# Patient Record
Sex: MALE | Race: WHITE | NOT HISPANIC OR LATINO | Employment: OTHER | URBAN - NONMETROPOLITAN AREA
[De-identification: names, ages, dates, MRNs, and addresses within clinical notes are randomized per-mention and may not be internally consistent; named-entity substitution may affect disease eponyms.]

---

## 2017-03-13 ENCOUNTER — TELEPHONE (OUTPATIENT)
Dept: FAMILY MEDICINE CLINIC | Facility: CLINIC | Age: 57
End: 2017-03-13

## 2017-03-13 RX ORDER — AMLODIPINE BESYLATE 5 MG/1
5 TABLET ORAL DAILY
Qty: 30 TABLET | Refills: 5 | Status: SHIPPED | OUTPATIENT
Start: 2017-03-13 | End: 2017-05-18 | Stop reason: SDUPTHER

## 2017-03-13 RX ORDER — AMLODIPINE BESYLATE 5 MG/1
TABLET ORAL
Qty: 30 TABLET | Refills: 0 | Status: SHIPPED | OUTPATIENT
Start: 2017-03-13 | End: 2017-03-13 | Stop reason: SDUPTHER

## 2017-04-03 ENCOUNTER — TELEPHONE (OUTPATIENT)
Dept: FAMILY MEDICINE CLINIC | Facility: CLINIC | Age: 57
End: 2017-04-03

## 2017-04-03 RX ORDER — CITALOPRAM 40 MG/1
40 TABLET ORAL DAILY
Qty: 30 TABLET | Refills: 5 | Status: SHIPPED | OUTPATIENT
Start: 2017-04-03 | End: 2017-05-18 | Stop reason: SDUPTHER

## 2017-04-03 RX ORDER — LISINOPRIL 40 MG/1
80 TABLET ORAL DAILY
Qty: 60 TABLET | Refills: 5 | Status: SHIPPED | OUTPATIENT
Start: 2017-04-03 | End: 2017-05-18 | Stop reason: SDUPTHER

## 2017-04-03 NOTE — TELEPHONE ENCOUNTER
----- Message from Casi Davis sent at 4/3/2017  3:45 PM CDT -----  Regarding: FABRICIO CAMERON  Contact: [phone]  MARA MCKEON MADE APPT FOR MAY AS THAT IS FIRST OPENING..BUT IS NEEDING REFILLS TO BE SENT TO [facility] FOR  AMLODIPINE..LISINIOPRIL..AND CITALOPRAM

## 2017-04-12 RX ORDER — ZOLPIDEM TARTRATE 10 MG/1
TABLET ORAL
Qty: 30 TABLET | Refills: 0 | Status: CANCELLED | OUTPATIENT
Start: 2017-04-12

## 2017-04-12 NOTE — TELEPHONE ENCOUNTER
Please advise on refill request for his Ambien 10 mg  #30 Take 1 HS PRN    LAST OV  12/21/16  NEXT SCHEDULED OV 5/03/17  LAST SCRIPT WRITTEN 12/21/17 #30 WITH 2 REFILLS  LAST JESS NOTHING ON FILE IN EPIC.

## 2017-04-13 RX ORDER — ZOLPIDEM TARTRATE 10 MG/1
10 TABLET ORAL NIGHTLY PRN
Qty: 30 TABLET | Refills: 2 | Status: SHIPPED | OUTPATIENT
Start: 2017-04-13 | End: 2017-05-18 | Stop reason: SDUPTHER

## 2017-05-18 ENCOUNTER — OFFICE VISIT (OUTPATIENT)
Dept: FAMILY MEDICINE CLINIC | Facility: CLINIC | Age: 57
End: 2017-05-18

## 2017-05-18 VITALS
WEIGHT: 264.9 LBS | OXYGEN SATURATION: 98 % | HEART RATE: 78 BPM | DIASTOLIC BLOOD PRESSURE: 80 MMHG | BODY MASS INDEX: 38.01 KG/M2 | SYSTOLIC BLOOD PRESSURE: 128 MMHG

## 2017-05-18 DIAGNOSIS — I10 ESSENTIAL HYPERTENSION: Primary | ICD-10-CM

## 2017-05-18 DIAGNOSIS — M15.9 PRIMARY OSTEOARTHRITIS INVOLVING MULTIPLE JOINTS: ICD-10-CM

## 2017-05-18 PROCEDURE — 99214 OFFICE O/P EST MOD 30 MIN: CPT | Performed by: FAMILY MEDICINE

## 2017-05-18 PROCEDURE — 96372 THER/PROPH/DIAG INJ SC/IM: CPT | Performed by: FAMILY MEDICINE

## 2017-05-18 RX ORDER — MOMETASONE FUROATE 50 UG/1
2 SPRAY, METERED NASAL DAILY
Qty: 1 EACH | Refills: 5 | Status: SHIPPED | OUTPATIENT
Start: 2017-05-18 | End: 2018-04-18 | Stop reason: SDUPTHER

## 2017-05-18 RX ORDER — TRIAMCINOLONE ACETONIDE 40 MG/ML
80 INJECTION, SUSPENSION INTRA-ARTICULAR; INTRAMUSCULAR ONCE
Status: COMPLETED | OUTPATIENT
Start: 2017-05-18 | End: 2017-05-18

## 2017-05-18 RX ORDER — MOMETASONE FUROATE 50 UG/1
2 SPRAY, METERED NASAL DAILY
Refills: 11 | COMMUNITY
Start: 2017-04-04 | End: 2017-05-18 | Stop reason: SDUPTHER

## 2017-05-18 RX ORDER — CYCLOBENZAPRINE HCL 10 MG
10 TABLET ORAL 2 TIMES DAILY PRN
Qty: 60 TABLET | Refills: 11 | Status: SHIPPED | OUTPATIENT
Start: 2017-05-18 | End: 2017-12-07 | Stop reason: SDUPTHER

## 2017-05-18 RX ORDER — AMLODIPINE BESYLATE 5 MG/1
5 TABLET ORAL DAILY
Qty: 30 TABLET | Refills: 5 | Status: SHIPPED | OUTPATIENT
Start: 2017-05-18 | End: 2018-04-18 | Stop reason: SDUPTHER

## 2017-05-18 RX ORDER — ZOLPIDEM TARTRATE 10 MG/1
10 TABLET ORAL NIGHTLY PRN
Qty: 30 TABLET | Refills: 2 | Status: SHIPPED | OUTPATIENT
Start: 2017-05-18 | End: 2017-07-11 | Stop reason: SDUPTHER

## 2017-05-18 RX ORDER — CITALOPRAM 40 MG/1
40 TABLET ORAL DAILY
Qty: 30 TABLET | Refills: 5 | Status: SHIPPED | OUTPATIENT
Start: 2017-05-18 | End: 2017-10-13 | Stop reason: SDUPTHER

## 2017-05-18 RX ORDER — MELOXICAM 7.5 MG/1
7.5 TABLET ORAL DAILY
Qty: 30 TABLET | Refills: 5 | Status: SHIPPED | OUTPATIENT
Start: 2017-05-18 | End: 2017-12-07 | Stop reason: SDUPTHER

## 2017-05-18 RX ORDER — LISINOPRIL 40 MG/1
80 TABLET ORAL DAILY
Qty: 60 TABLET | Refills: 5 | Status: SHIPPED | OUTPATIENT
Start: 2017-05-18 | End: 2018-04-18 | Stop reason: SDUPTHER

## 2017-05-18 RX ADMIN — TRIAMCINOLONE ACETONIDE 80 MG: 40 INJECTION, SUSPENSION INTRA-ARTICULAR; INTRAMUSCULAR at 11:07

## 2017-07-11 RX ORDER — ZOLPIDEM TARTRATE 10 MG/1
10 TABLET ORAL NIGHTLY PRN
Qty: 30 TABLET | Refills: 2 | Status: SHIPPED | OUTPATIENT
Start: 2017-07-11 | End: 2017-10-13 | Stop reason: SDUPTHER

## 2017-07-11 RX ORDER — ZOLPIDEM TARTRATE 10 MG/1
TABLET ORAL
Qty: 30 TABLET | Refills: 0 | Status: CANCELLED | OUTPATIENT
Start: 2017-07-11

## 2017-09-13 ENCOUNTER — OFFICE VISIT (OUTPATIENT)
Dept: SLEEP MEDICINE | Facility: HOSPITAL | Age: 57
End: 2017-09-13

## 2017-09-13 VITALS
OXYGEN SATURATION: 97 % | SYSTOLIC BLOOD PRESSURE: 130 MMHG | HEART RATE: 84 BPM | DIASTOLIC BLOOD PRESSURE: 70 MMHG | WEIGHT: 268 LBS | HEIGHT: 70 IN | BODY MASS INDEX: 38.37 KG/M2

## 2017-09-13 DIAGNOSIS — F51.04 PSYCHOPHYSIOLOGICAL INSOMNIA: ICD-10-CM

## 2017-09-13 DIAGNOSIS — G47.33 OBSTRUCTIVE SLEEP APNEA, ADULT: Primary | ICD-10-CM

## 2017-09-13 PROCEDURE — 99203 OFFICE O/P NEW LOW 30 MIN: CPT | Performed by: INTERNAL MEDICINE

## 2017-09-13 NOTE — PROGRESS NOTES
New Patient Sleep Medicine Consultation    Encounter Date: 9/13/2017         Patient's PCP: Yahir Davis MD  Referring provider: No ref. provider found  Reason for consultation: known [facility] needs supplies    James Fabian is a 57 y.o. male who is compliant with CPAP therapy, but was unable to follow up secondary to lack of physician. He is recently retired  His bedtime is ~  0200. He  falls asleep after taken ambien. His sleep latency is 1 hour. He gets up 2-3 times per night. He wakes up ~ 1000. He drinks 0 cups of coffee, 2 decaf teas, and 1 diet sodas per day. He drinks < 1 alcoholic beverages per week. He does not smoke. He denies cataplexy, sleep paralysis, or hypnagogic hallucinations.He naps for 1-2 hour at a time, around 1430 pm.    [place] - 5    Past Medical History:   Diagnosis Date   • Acute bronchitis 01/09/2016   • Allergic rhinitis 01/09/2016   • Anxiety 05/05/2016   • Backache 11/05/2013   • Chronic bronchitis    • Common cold 002971   • Cough 12/21/2013   • Depressive disorder [id number]   • Elevated blood-pressure reading without diagnosis of hypertension [id number]   • Essential hypertension 395003   • Generalized muscle ache    • Insomnia [id number]   • MADYSON (obstructive sleep apnea)    • Osteoarthritis of multiple joints 10/19/2011   • Overweight    • Shoulder pain [id number]   • SI (sacroiliac) joint inflammation 898091   • URI (upper respiratory infection) 077939     Social History     Social History   • Marital status:      Spouse name: N/A   • Number of children: N/A   • Years of education: N/A     Occupational History   • Not on file.     Social History Main Topics   • Smoking status: Former Smoker   • Smokeless tobacco: Never Used   • Alcohol use Yes      Comment: MODERATE   • Drug use: Not on file   • Sexual activity: Not on file     Other Topics Concern   • Not on file     Social History Narrative     Family History   Problem Relation Age of Onset   • Heart disease Other      Retired  "superintendent of water filtration.  2 daughters  Smoking history: smoked 2 ppds from age 17 until 46  FH of sleep disorders: none    Review of Systems:  wants to get off ambien Patient advised to discuss any positive ROS with PCP.      Vitals:    09/13/17 1552   BP: 130/70   Pulse: 84   SpO2: 97%     Body mass index is 38.45 kg/(m^2).      Vitals:    09/13/17 1552   BP: 130/70   Pulse: 84   SpO2: 97%     Body mass index is 38.45 kg/(m^2).  Neck circumference: 18\"            General: Alert. Cooperative. Well developed. No acute distress.             Head:  Normocephalic. Symmetrical. Atraumatic.              Eyes: Sclera clear. No icterus. PERRLA. Normal EOM.             Ears: No deformities. Normal hearing.             Nose: No septal deviation. No drainage.          Throat: No oral lesions. No thrush. Moist mucous membranes.    Tongue is normal    Dentition is fair       Pharynx: Posterior pharyngeal pillars are wide    Mallampati score of IV (only hard palate visible)    Pharynx is nonerythematous   Chest Wall:  Normal shape. Symmetric expansion with respiration. No tenderness.             Neck:  Trachea midline.           Lungs:  Clear to auscultation bilaterally. No wheezes. No rhonchi. No rales. Respirations regular, even and unlabored.            Heart:  Regular rhythm and normal rate. Normal S1 and S2. No murmur.     Abdomen:  Soft, non-tender and non-distended. Normal bowel sounds. No masses.  Extremities:  Moves all extremities well. No edema.           Pulses: Pulses palpable and equal bilaterally.               Skin: Dry. Intact. No bleeding. No rash.           Neuro: Moves all 4 extremities and cranial nerves grossly intact.  Psychiatric: Normal mood and affect.    Current Outpatient Prescriptions:   •  amLODIPine (NORVASC) 5 MG tablet, Take 1 tablet by mouth Daily., Disp: 30 tablet, Rfl: 5  •  citalopram (CeleXA) 40 MG tablet, Take 1 tablet by mouth Daily., Disp: 30 tablet, Rfl: 5  •  cyclobenzaprine " (FLEXERIL) 10 MG tablet, Take 1 tablet by mouth 2 (Two) Times a Day As Needed for Muscle Spasms., Disp: 60 tablet, Rfl: 11  •  cyclobenzaprine (FLEXERIL) 10 MG tablet, Take 1 tablet by mouth 3 (Three) Times a Day As Needed for Muscle Spasms., Disp: 30 tablet, Rfl: 0  •  lisinopril (PRINIVIL,ZESTRIL) 40 MG tablet, Take 2 tablets by mouth Daily., Disp: 60 tablet, Rfl: 5  •  meloxicam (MOBIC) 7.5 MG tablet, Take 1 tablet by mouth Daily., Disp: 30 tablet, Rfl: 5  •  mometasone (NASONEX) 50 MCG/ACT nasal spray, 2 sprays into each nostril Daily., Disp: 1 each, Rfl: 5  •  zolpidem (AMBIEN) 10 MG tablet, Take 1 tablet by mouth At Night As Needed for Sleep., Disp: 30 tablet, Rfl: 2    ASSESSMENT:  1. Obstructive sleep apnea PSG on 05/01/2008 AHI of 70, on CPAP 13 cm H2O. Current compliance is 1807/1809 days of compliance  2. Insomnia - sleep onset - wean ambien as tolerated      RTC in 6 -12 months     This document has been electronically signed by Antonio Jones MD on September 13, 2017         CC: Yahir Davis MD          No ref. provider found

## 2017-10-13 ENCOUNTER — TELEPHONE (OUTPATIENT)
Dept: FAMILY MEDICINE CLINIC | Facility: CLINIC | Age: 57
End: 2017-10-13

## 2017-10-13 RX ORDER — ZOLPIDEM TARTRATE 10 MG/1
10 TABLET ORAL NIGHTLY PRN
Qty: 30 TABLET | Refills: 2 | Status: SHIPPED | OUTPATIENT
Start: 2017-10-13 | End: 2018-04-18

## 2017-10-13 RX ORDER — CITALOPRAM 40 MG/1
40 TABLET ORAL DAILY
Qty: 30 TABLET | Refills: 5 | Status: SHIPPED | OUTPATIENT
Start: 2017-10-13 | End: 2018-07-17

## 2017-10-13 NOTE — TELEPHONE ENCOUNTER
Please Advise on the Ambien.    Last OV 5/8/17  Next OV 11/17/17  Pt. Canceled Appt 8/8/17    Last Script written 7/11/17 #30 with 2 refills  Last JESS 4/14/17    Please Advise

## 2017-10-13 NOTE — TELEPHONE ENCOUNTER
Ms. Whitt has been called and advised that we did get both refill request.  She is reminded that all pharmacy refills can take up to 24-48 hours before refilled.  Especially with controlled meds and with it being after 1:00 on Friday.  She was told to check with his pharmacy later this afternoon to see if the refills have been taken care of, if not it will be Monday.     ---- Message from Amisha Garcia sent at 10/13/2017  3:00 PM CDT -----  Pt needs refill of Celexa and Ambien sent to [facility]. Call patient's andre Garcia when sent in at [phone].

## 2017-10-13 NOTE — TELEPHONE ENCOUNTER
VM LEFT THAT THIS PATIENT IS NEEDING REFILLS ON HIS citalopram (CeleXA) 40 MG tablet AND zolpidem (AMBIEN) 10 MG tablet.

## 2017-12-07 RX ORDER — CYCLOBENZAPRINE HCL 10 MG
10 TABLET ORAL 2 TIMES DAILY PRN
Qty: 60 TABLET | Refills: 0 | Status: SHIPPED | OUTPATIENT
Start: 2017-12-07 | End: 2018-04-18 | Stop reason: SDUPTHER

## 2017-12-07 RX ORDER — MELOXICAM 7.5 MG/1
7.5 TABLET ORAL DAILY
Qty: 30 TABLET | Refills: 0 | Status: SHIPPED | OUTPATIENT
Start: 2017-12-07 | End: 2018-04-18 | Stop reason: SDUPTHER

## 2017-12-07 RX ORDER — MELOXICAM 7.5 MG/1
TABLET ORAL
Qty: 30 TABLET | Refills: 0 | OUTPATIENT
Start: 2017-12-07

## 2017-12-07 RX ORDER — CYCLOBENZAPRINE HCL 10 MG
TABLET ORAL
Qty: 60 TABLET | Refills: 0 | OUTPATIENT
Start: 2017-12-07

## 2018-04-18 ENCOUNTER — OFFICE VISIT (OUTPATIENT)
Dept: FAMILY MEDICINE CLINIC | Facility: CLINIC | Age: 58
End: 2018-04-18

## 2018-04-18 VITALS
SYSTOLIC BLOOD PRESSURE: 160 MMHG | HEIGHT: 60 IN | HEART RATE: 85 BPM | DIASTOLIC BLOOD PRESSURE: 98 MMHG | OXYGEN SATURATION: 98 % | BODY MASS INDEX: 55.56 KG/M2 | WEIGHT: 283 LBS | TEMPERATURE: 97.8 F | RESPIRATION RATE: 20 BRPM

## 2018-04-18 DIAGNOSIS — Z13.220 SCREENING FOR HYPERCHOLESTEROLEMIA: ICD-10-CM

## 2018-04-18 DIAGNOSIS — I10 ESSENTIAL HYPERTENSION: ICD-10-CM

## 2018-04-18 DIAGNOSIS — R53.82 CHRONIC FATIGUE: ICD-10-CM

## 2018-04-18 DIAGNOSIS — Z76.89 ENCOUNTER TO ESTABLISH CARE: ICD-10-CM

## 2018-04-18 DIAGNOSIS — J30.1 CHRONIC SEASONAL ALLERGIC RHINITIS DUE TO POLLEN: ICD-10-CM

## 2018-04-18 DIAGNOSIS — Z12.5 SCREENING FOR PROSTATE CANCER: ICD-10-CM

## 2018-04-18 DIAGNOSIS — G89.29 CHRONIC MIDLINE LOW BACK PAIN WITHOUT SCIATICA: ICD-10-CM

## 2018-04-18 DIAGNOSIS — M54.50 CHRONIC MIDLINE LOW BACK PAIN WITHOUT SCIATICA: ICD-10-CM

## 2018-04-18 DIAGNOSIS — F51.05 INSOMNIA DUE TO OTHER MENTAL DISORDER: ICD-10-CM

## 2018-04-18 DIAGNOSIS — F41.9 ANXIETY: Primary | ICD-10-CM

## 2018-04-18 DIAGNOSIS — F99 INSOMNIA DUE TO OTHER MENTAL DISORDER: ICD-10-CM

## 2018-04-18 DIAGNOSIS — Z12.11 SCREENING FOR COLON CANCER: ICD-10-CM

## 2018-04-18 PROCEDURE — 99214 OFFICE O/P EST MOD 30 MIN: CPT | Performed by: NURSE PRACTITIONER

## 2018-04-18 RX ORDER — AMLODIPINE BESYLATE 5 MG/1
5 TABLET ORAL DAILY
Qty: 30 TABLET | Refills: 5 | Status: SHIPPED | OUTPATIENT
Start: 2018-04-18 | End: 2018-12-10 | Stop reason: SDUPTHER

## 2018-04-18 RX ORDER — BUSPIRONE HYDROCHLORIDE 10 MG/1
10 TABLET ORAL 3 TIMES DAILY
Qty: 90 TABLET | Refills: 3 | Status: SHIPPED | OUTPATIENT
Start: 2018-04-18 | End: 2018-12-07

## 2018-04-18 RX ORDER — HYDROXYZINE PAMOATE 100 MG/1
100 CAPSULE ORAL NIGHTLY PRN
Qty: 30 CAPSULE | Refills: 3 | Status: SHIPPED | OUTPATIENT
Start: 2018-04-18 | End: 2018-07-17

## 2018-04-18 RX ORDER — VENLAFAXINE 50 MG/1
50 TABLET ORAL 2 TIMES DAILY
Qty: 60 TABLET | Refills: 3 | Status: SHIPPED | OUTPATIENT
Start: 2018-04-18 | End: 2018-07-17 | Stop reason: DRUGHIGH

## 2018-04-18 RX ORDER — MELOXICAM 7.5 MG/1
7.5 TABLET ORAL DAILY
Qty: 30 TABLET | Refills: 0 | Status: SHIPPED | OUTPATIENT
Start: 2018-04-18 | End: 2018-05-15 | Stop reason: SDUPTHER

## 2018-04-18 RX ORDER — LISINOPRIL 40 MG/1
80 TABLET ORAL DAILY
Qty: 60 TABLET | Refills: 5 | Status: SHIPPED | OUTPATIENT
Start: 2018-04-18 | End: 2018-12-10 | Stop reason: SDUPTHER

## 2018-04-18 RX ORDER — CYCLOBENZAPRINE HCL 10 MG
10 TABLET ORAL 2 TIMES DAILY PRN
Qty: 60 TABLET | Refills: 0 | Status: SHIPPED | OUTPATIENT
Start: 2018-04-18 | End: 2018-12-07 | Stop reason: SDUPTHER

## 2018-04-18 RX ORDER — MOMETASONE FUROATE 50 UG/1
2 SPRAY, METERED NASAL DAILY
Qty: 1 EACH | Refills: 5 | Status: SHIPPED | OUTPATIENT
Start: 2018-04-18 | End: 2018-12-10 | Stop reason: SDUPTHER

## 2018-04-18 NOTE — PROGRESS NOTES
"Svitlana Fabian is a 58 y.o. male.  Establish primary care.  Has extensive history os anxiety and panic attacks with seasonal depression.  He has been treated with Celexa and Xanax in the past but is out of the Xanax and does not feel like the Celexa is working any more.   Over the last 3 months the symptoms have gotten increasingly worse.  Is now having panic attacks driving in the car or sitting in a room with the door closed.  \"I feel  very anxious my mind is racing.\"  [place] but this year in [place] the weather was chilly as well which did not help his depression.  The anxiety is causing increase in insomnia.  Was on Ambien in the past but does not want to continue on that medication anymore.  Only averaging a few hours asleep at night now.    Anxiety   Presents for initial visit. Onset was more than 5 years ago. The problem has been rapidly worsening. Symptoms include decreased concentration, excessive worry, insomnia, malaise, nervous/anxious behavior, panic and shortness of breath. Symptoms occur constantly. The severity of symptoms is interfering with daily activities and moderate. Nothing aggravates the symptoms. The patient sleeps 4 hours per night. The quality of sleep is poor. Nighttime awakenings: one to two.     There are no known risk factors. His past medical history is significant for anxiety/panic attacks and depression. Past treatments include lifestyle changes, SSRIs and benzodiazephines. The treatment provided moderate relief. Compliance with prior treatments has been good.   Insomnia   This is a chronic problem. The current episode started more than 1 year ago. The problem occurs constantly. The problem has been waxing and waning. Nothing aggravates the symptoms. Treatments tried: Ambien. The treatment provided moderate relief.        The following portions of the patient's history were reviewed and updated as appropriate: " allergies, current medications, past family history, past medical history, past social history, past surgical history and problem list.    Review of Systems   Constitutional: Negative.    HENT: Negative.    Eyes: Negative.    Respiratory: Positive for shortness of breath.    Cardiovascular: Negative.    Gastrointestinal: Negative.    Genitourinary: Negative.    Musculoskeletal: Negative.    Skin: Negative.    Neurological: Negative.    Psychiatric/Behavioral: Positive for decreased concentration. The patient is nervous/anxious and has insomnia.        Objective   Physical Exam   Constitutional: He is oriented to person, place, and time. He appears well-developed and well-nourished. No distress.   HENT:   Head: Normocephalic and atraumatic.   Right Ear: External ear normal.   Left Ear: External ear normal.   Mouth/Throat: Oropharynx is clear and moist.   Eyes: Conjunctivae and EOM are normal. Pupils are equal, round, and reactive to light.   Neck: Normal range of motion. Neck supple.   Cardiovascular: Normal rate, regular rhythm and normal heart sounds.    Pulmonary/Chest: Effort normal and breath sounds normal. No respiratory distress.   Abdominal: Soft. Bowel sounds are normal.   Musculoskeletal: Normal range of motion.   Neurological: He is alert and oriented to person, place, and time.   Skin: Skin is warm and dry. Capillary refill takes less than 2 seconds. He is not diaphoretic.   Psychiatric: His speech is normal and behavior is normal. Judgment and thought content normal. His affect is blunt.   Nursing note and vitals reviewed.      Assessment/Plan   Problems Addressed this Visit        Cardiovascular and Mediastinum    Essential hypertension    Relevant Medications    amLODIPine (NORVASC) 5 MG tablet    lisinopril (PRINIVIL,ZESTRIL) 40 MG tablet      Other Visit Diagnoses     Anxiety    -  Primary    Relevant Medications    venlafaxine (EFFEXOR) 50 MG tablet    busPIRone (BUSPAR) 10 MG tablet    Insomnia due  to other mental disorder        Relevant Medications    venlafaxine (EFFEXOR) 50 MG tablet    busPIRone (BUSPAR) 10 MG tablet    hydrOXYzine (VISTARIL) 100 MG capsule    Chronic midline low back pain without sciatica        Relevant Medications    meloxicam (MOBIC) 7.5 MG tablet    cyclobenzaprine (FLEXERIL) 10 MG tablet    Screening for prostate cancer        Relevant Orders    PSA Screen    Screening for hypercholesterolemia        Relevant Orders    Lipid panel    Screening for colon cancer        Relevant Orders    Ambulatory Referral For Screening Colonoscopy    Chronic fatigue        Relevant Medications    venlafaxine (EFFEXOR) 50 MG tablet    busPIRone (BUSPAR) 10 MG tablet    hydrOXYzine (VISTARIL) 100 MG capsule    Other Relevant Orders    CBC & Differential    Comprehensive Metabolic Panel    Chronic seasonal allergic rhinitis due to pollen        Relevant Medications    mometasone (NASONEX) 50 MCG/ACT nasal spray    Encounter to establish care            1.  Anxiety:  Discontinue Celexa and tapering instructions provided  Begin Effexor 50 mg once tapered off Celexa prescribed to be taken 1 by mouth twice a day  Educated on possible side effects  Begin BuSpar 10 mg as prescribed be taken 1 by mouth 3 times a day when necessary for anxiety  Educated on possible sedative side effects of encouraged not to take this medication work or drive until after seeing how it makes him feel    2.  Insomnia due to other mental disorder:  Begin Vistaril 100 mg as prescribed be taken 1 by mouth proximate 30 minutes prior to bedtime  Educated on sedative side effects of this medication and instructed not to take this medication work or drive    3.  Chronic midline low back pain without sciatica:  Continue on Mobic and Flexeril as previously prescribed and refill prescription sent to pharmacy    4.  Screening for prostate cancer:  Complete lab work as ordered including PSA and will call with results when available    5.   Screening for hypercholesterolemia:  Complete lab work as ordered including fasting lipid panel and will call with results when available    6.  Screening for colon cancer:  Ambulatory referral placed for screening colonoscopy and Gen. surgery will call to schedule appointment    7.  Chronic fatigue:  Complete lab work as ordered including CBC and chemistry panel and will call with results when available    8.  Chronic seasonal allergic rhinitis due to pollen:  Continue on Nasonex as previously prescribed refill prescription sent to pharmacy    9.  Essential hypertension:  Continue on lisinopril and amlodipine as previously prescribed and refill prescription sent to pharmacy  Encouraged to reduce sodium intake to no more than 2400 mg a day    10.  Encounter to establish care:  Schedule follow-up appointment with this office 4 weeks after beginning Effexor for recheck of anxiety  Will discuss BMI and weight loss treatment options at next visit        This document has been electronically signed by ISABEL Reynolds on April 18, 2018 12:38 PM

## 2018-04-18 NOTE — PATIENT INSTRUCTIONS
Wean off Celexa.  One every other day for a week.  Then one every two days for a week.  Then one every three days for a week.  Then one in the last week.

## 2018-05-14 RX ORDER — CITALOPRAM 40 MG/1
40 TABLET ORAL DAILY
Qty: 30 TABLET | Refills: 3 | Status: SHIPPED | OUTPATIENT
Start: 2018-05-14 | End: 2018-07-17

## 2018-05-15 DIAGNOSIS — G89.29 CHRONIC MIDLINE LOW BACK PAIN WITHOUT SCIATICA: ICD-10-CM

## 2018-05-15 DIAGNOSIS — M54.50 CHRONIC MIDLINE LOW BACK PAIN WITHOUT SCIATICA: ICD-10-CM

## 2018-05-15 RX ORDER — MELOXICAM 7.5 MG/1
7.5 TABLET ORAL DAILY
Qty: 30 TABLET | Refills: 3 | Status: SHIPPED | OUTPATIENT
Start: 2018-05-15 | End: 2018-12-10 | Stop reason: SDUPTHER

## 2018-05-15 RX ORDER — MELOXICAM 7.5 MG/1
TABLET ORAL
Qty: 30 TABLET | Refills: 0 | Status: CANCELLED | OUTPATIENT
Start: 2018-05-15

## 2018-06-22 ENCOUNTER — LAB (OUTPATIENT)
Dept: LAB | Facility: HOSPITAL | Age: 58
End: 2018-06-22

## 2018-06-22 DIAGNOSIS — Z12.5 SCREENING FOR PROSTATE CANCER: ICD-10-CM

## 2018-06-22 DIAGNOSIS — R53.82 CHRONIC FATIGUE: ICD-10-CM

## 2018-06-22 DIAGNOSIS — Z13.220 SCREENING FOR HYPERCHOLESTEROLEMIA: ICD-10-CM

## 2018-06-22 LAB
ALBUMIN SERPL-MCNC: 4.1 G/DL (ref 3.4–4.8)
ALBUMIN/GLOB SERPL: 1.3 G/DL (ref 1.1–1.8)
ALP SERPL-CCNC: 55 U/L (ref 38–126)
ALT SERPL W P-5'-P-CCNC: 51 U/L (ref 21–72)
ANION GAP SERPL CALCULATED.3IONS-SCNC: 10 MMOL/L (ref 5–15)
ARTICHOKE IGE QN: 119 MG/DL (ref 1–129)
AST SERPL-CCNC: 47 U/L (ref 17–59)
BILIRUB SERPL-MCNC: 0.6 MG/DL (ref 0.2–1.3)
BUN BLD-MCNC: 14 MG/DL (ref 7–21)
BUN/CREAT SERPL: 14.4 (ref 7–25)
CALCIUM SPEC-SCNC: 8.7 MG/DL (ref 8.4–10.2)
CHLORIDE SERPL-SCNC: 104 MMOL/L (ref 95–110)
CHOLEST SERPL-MCNC: 188 MG/DL (ref 0–199)
CO2 SERPL-SCNC: 27 MMOL/L (ref 22–31)
CREAT BLD-MCNC: 0.97 MG/DL (ref 0.7–1.3)
GFR SERPL CREATININE-BSD FRML MDRD: 80 ML/MIN/1.73 (ref 56–130)
GLOBULIN UR ELPH-MCNC: 3.1 GM/DL (ref 2.3–3.5)
GLUCOSE BLD-MCNC: 106 MG/DL (ref 60–100)
HDLC SERPL-MCNC: 51 MG/DL (ref 60–200)
LDLC/HDLC SERPL: 2.22 {RATIO} (ref 0–3.55)
POTASSIUM BLD-SCNC: 4 MMOL/L (ref 3.5–5.1)
PROT SERPL-MCNC: 7.2 G/DL (ref 6.3–8.6)
PSA SERPL-MCNC: 0.45 NG/ML (ref 0–4)
SODIUM BLD-SCNC: 141 MMOL/L (ref 137–145)
TRIGL SERPL-MCNC: 120 MG/DL (ref 20–199)

## 2018-06-22 PROCEDURE — G0103 PSA SCREENING: HCPCS

## 2018-06-22 PROCEDURE — 80053 COMPREHEN METABOLIC PANEL: CPT

## 2018-06-22 PROCEDURE — 80061 LIPID PANEL: CPT

## 2018-06-25 ENCOUNTER — TELEPHONE (OUTPATIENT)
Dept: FAMILY MEDICINE CLINIC | Facility: CLINIC | Age: 58
End: 2018-06-25

## 2018-06-25 NOTE — TELEPHONE ENCOUNTER
Per ISABEL Elliott, James Fabian was called and given recent lab results. Continue current meds and follow up as planned or sooner if any problems.

## 2018-07-17 ENCOUNTER — OFFICE VISIT (OUTPATIENT)
Dept: FAMILY MEDICINE CLINIC | Facility: CLINIC | Age: 58
End: 2018-07-17

## 2018-07-17 VITALS
SYSTOLIC BLOOD PRESSURE: 134 MMHG | DIASTOLIC BLOOD PRESSURE: 84 MMHG | WEIGHT: 272 LBS | BODY MASS INDEX: 53.4 KG/M2 | HEIGHT: 60 IN

## 2018-07-17 DIAGNOSIS — F41.9 ANXIETY: ICD-10-CM

## 2018-07-17 PROCEDURE — 99213 OFFICE O/P EST LOW 20 MIN: CPT | Performed by: NURSE PRACTITIONER

## 2018-07-17 RX ORDER — VENLAFAXINE 75 MG/1
75 TABLET ORAL 2 TIMES DAILY
Qty: 60 TABLET | Refills: 3 | Status: SHIPPED | OUTPATIENT
Start: 2018-07-17 | End: 2018-12-10 | Stop reason: SDUPTHER

## 2018-12-10 ENCOUNTER — OFFICE VISIT (OUTPATIENT)
Dept: FAMILY MEDICINE CLINIC | Facility: CLINIC | Age: 58
End: 2018-12-10

## 2018-12-10 VITALS
DIASTOLIC BLOOD PRESSURE: 90 MMHG | WEIGHT: 252 LBS | BODY MASS INDEX: 36.08 KG/M2 | SYSTOLIC BLOOD PRESSURE: 134 MMHG | HEIGHT: 70 IN

## 2018-12-10 DIAGNOSIS — J30.1 CHRONIC SEASONAL ALLERGIC RHINITIS DUE TO POLLEN: ICD-10-CM

## 2018-12-10 DIAGNOSIS — M54.50 CHRONIC MIDLINE LOW BACK PAIN WITHOUT SCIATICA: ICD-10-CM

## 2018-12-10 DIAGNOSIS — Z13.29 SCREENING FOR HYPOTHYROIDISM: ICD-10-CM

## 2018-12-10 DIAGNOSIS — F41.9 ANXIETY: ICD-10-CM

## 2018-12-10 DIAGNOSIS — Z23 FLU VACCINE NEED: ICD-10-CM

## 2018-12-10 DIAGNOSIS — G89.29 CHRONIC MIDLINE LOW BACK PAIN WITHOUT SCIATICA: ICD-10-CM

## 2018-12-10 DIAGNOSIS — I10 ESSENTIAL HYPERTENSION: Primary | ICD-10-CM

## 2018-12-10 DIAGNOSIS — Z13.220 SCREENING FOR HYPERCHOLESTEROLEMIA: ICD-10-CM

## 2018-12-10 PROCEDURE — 99214 OFFICE O/P EST MOD 30 MIN: CPT | Performed by: NURSE PRACTITIONER

## 2018-12-10 PROCEDURE — 90471 IMMUNIZATION ADMIN: CPT | Performed by: NURSE PRACTITIONER

## 2018-12-10 PROCEDURE — 90674 CCIIV4 VAC NO PRSV 0.5 ML IM: CPT | Performed by: NURSE PRACTITIONER

## 2018-12-10 RX ORDER — VENLAFAXINE 75 MG/1
75 TABLET ORAL 2 TIMES DAILY
Qty: 180 TABLET | Refills: 2 | Status: SHIPPED | OUTPATIENT
Start: 2018-12-10 | End: 2019-09-02 | Stop reason: SDUPTHER

## 2018-12-10 RX ORDER — CYCLOBENZAPRINE HCL 10 MG
10 TABLET ORAL 2 TIMES DAILY PRN
Qty: 60 TABLET | Refills: 3 | OUTPATIENT
Start: 2018-12-10 | End: 2019-08-09

## 2018-12-10 RX ORDER — MELOXICAM 7.5 MG/1
7.5 TABLET ORAL DAILY
Qty: 90 TABLET | Refills: 2 | Status: SHIPPED | OUTPATIENT
Start: 2018-12-10 | End: 2019-08-09 | Stop reason: ALTCHOICE

## 2018-12-10 RX ORDER — LISINOPRIL 40 MG/1
80 TABLET ORAL DAILY
Qty: 90 TABLET | Refills: 2 | Status: SHIPPED | OUTPATIENT
Start: 2018-12-10 | End: 2019-04-25 | Stop reason: SDUPTHER

## 2018-12-10 RX ORDER — AMLODIPINE BESYLATE 5 MG/1
5 TABLET ORAL DAILY
Qty: 90 TABLET | Refills: 2 | Status: SHIPPED | OUTPATIENT
Start: 2018-12-10 | End: 2019-11-14 | Stop reason: SDUPTHER

## 2018-12-10 RX ORDER — MOMETASONE FUROATE 50 UG/1
2 SPRAY, METERED NASAL DAILY
Qty: 1 EACH | Refills: 5 | OUTPATIENT
Start: 2018-12-10 | End: 2019-08-09

## 2018-12-10 NOTE — PROGRESS NOTES
Subjective   James Fabian is a 58 y.o. male.  Six month follow-up for high blood pressure and anxiety    Anxiety   Presents for follow-up visit. Patient reports no chest pain, confusion or shortness of breath. Symptoms occur occasionally. The severity of symptoms is moderate. The quality of sleep is good. Nighttime awakenings: none.     Compliance with medications is %.   Hypertension   This is a chronic problem. The current episode started more than 1 year ago. The problem is unchanged. The problem is controlled. Associated symptoms include anxiety. Pertinent negatives include no chest pain, orthopnea, peripheral edema or shortness of breath. There are no associated agents to hypertension. Risk factors for coronary artery disease include diabetes mellitus, male gender and obesity. Past treatments include ACE inhibitors and calcium channel blockers. Current antihypertension treatment includes calcium channel blockers and ACE inhibitors. The current treatment provides moderate improvement. Compliance problems include diet and exercise.         The following portions of the patient's history were reviewed and updated as appropriate: allergies, current medications, past family history, past medical history, past social history, past surgical history and problem list.    Review of Systems   Constitutional: Negative.  Negative for chills, diaphoresis, fatigue and fever.   HENT: Negative.    Eyes: Negative.    Respiratory: Negative.  Negative for shortness of breath.    Cardiovascular: Negative.  Negative for chest pain and orthopnea.   Gastrointestinal: Negative.    Genitourinary: Negative.    Musculoskeletal: Negative.    Skin: Negative.    Neurological: Negative.    Psychiatric/Behavioral: Negative.  Negative for confusion.       Objective   Physical Exam   Constitutional: He is oriented to person, place, and time. He appears well-developed and well-nourished.   HENT:   Head: Normocephalic.   Right Ear:  External ear normal.   Left Ear: External ear normal.   Eyes: EOM are normal. Pupils are equal, round, and reactive to light.   Neck: Normal range of motion. Neck supple.   Cardiovascular: Normal rate, regular rhythm and normal heart sounds.   Pulmonary/Chest: Effort normal and breath sounds normal.   Abdominal: Soft. Bowel sounds are normal.   Musculoskeletal: Normal range of motion.   Neurological: He is alert and oriented to person, place, and time.   Skin: Skin is warm. Capillary refill takes less than 2 seconds.   Psychiatric: He has a normal mood and affect. His behavior is normal.   Nursing note and vitals reviewed.      Assessment/Plan   Problems Addressed this Visit        Cardiovascular and Mediastinum    Essential hypertension - Primary    Relevant Medications    amLODIPine (NORVASC) 5 MG tablet    lisinopril (PRINIVIL,ZESTRIL) 40 MG tablet    Other Relevant Orders    CBC & Differential    Comprehensive Metabolic Panel      Other Visit Diagnoses     Anxiety        Relevant Medications    venlafaxine (EFFEXOR) 75 MG tablet    Chronic midline low back pain without sciatica        Relevant Medications    meloxicam (MOBIC) 7.5 MG tablet    cyclobenzaprine (FLEXERIL) 10 MG tablet    Chronic seasonal allergic rhinitis due to pollen        Relevant Medications    mometasone (NASONEX) 50 MCG/ACT nasal spray    Screening for hypothyroidism        Relevant Orders    TSH    Screening for hypercholesterolemia        Relevant Orders    Lipid panel    Flu vaccine need        Relevant Orders    Flucelvax Quad=>4Years ([id number]) (Completed)        1.  Essential hypertension:  Continue on amlodipine and lisinopril as previously prescribed and refill prescription sent to pharmacy  Complete CBC and chemistry panel as ordered and will notify of results when available  Encouraged to reduce sodium intake to no more than 2000 mg per day  Encouraged increased physical activity regimen such as walking or biking in order to  promote cardiovascular health    2.  Anxiety:  Continue on Effexor as previously prescribed and refill prescription sent to pharmacy  Encouraged to seek emergency medical treatment for any new or worsening signs of anxiety or depression such as suicidal or homicidal ideations    3.  Chronic midline low back pain without sciatica:   Continue on myopic and Flexeril as previously prescribed and refill prescription sent to pharmacy    4.  Chronic seasonal allergic rhinitis due to pollen:  Continue on Nasonex as previously prescribed a refill prescription sent to pharmacy    5.  Screening for hypercholesterolemia:  Complete fasting lipid panel as ordered and will notify of results when available  Encouraged to adhere to low-fat diet    6.  Screening for hypothyroidism:  Complete TSH as ordered and will notify of results when available    7.  Flu vaccine the:  Influenza vaccine given IM in office  Educated on possible side effects of this medication including but not limited to pain, swelling and redness of injection site as well as flulike symptoms    Continue on current medications as previously prescribed   Schedule follow-up appointment with this office in 6 months for annual exam or sooner as needed        This document has been electronically signed by ISABEL Reynolds on December 10, 2018 12:55 PM

## 2019-04-25 DIAGNOSIS — I10 ESSENTIAL HYPERTENSION: ICD-10-CM

## 2019-04-25 RX ORDER — LISINOPRIL 40 MG/1
80 TABLET ORAL DAILY
Qty: 90 TABLET | Refills: 0 | Status: SHIPPED | OUTPATIENT
Start: 2019-04-25 | End: 2019-06-13 | Stop reason: SDUPTHER

## 2019-06-13 DIAGNOSIS — I10 ESSENTIAL HYPERTENSION: ICD-10-CM

## 2019-06-13 RX ORDER — LISINOPRIL 40 MG/1
80 TABLET ORAL DAILY
Qty: 90 TABLET | Refills: 0 | Status: SHIPPED | OUTPATIENT
Start: 2019-06-13 | End: 2019-11-14 | Stop reason: SDUPTHER

## 2019-08-27 ENCOUNTER — OFFICE VISIT (OUTPATIENT)
Dept: FAMILY MEDICINE CLINIC | Facility: CLINIC | Age: 59
End: 2019-08-27

## 2019-08-27 ENCOUNTER — RESULTS ENCOUNTER (OUTPATIENT)
Dept: FAMILY MEDICINE CLINIC | Facility: CLINIC | Age: 59
End: 2019-08-27

## 2019-08-27 VITALS
BODY MASS INDEX: 36.85 KG/M2 | HEIGHT: 70 IN | DIASTOLIC BLOOD PRESSURE: 86 MMHG | SYSTOLIC BLOOD PRESSURE: 134 MMHG | WEIGHT: 257.4 LBS

## 2019-08-27 DIAGNOSIS — Z12.11 SCREENING FOR COLON CANCER: ICD-10-CM

## 2019-08-27 DIAGNOSIS — F41.9 ANXIETY: ICD-10-CM

## 2019-08-27 DIAGNOSIS — I10 ESSENTIAL HYPERTENSION: Primary | ICD-10-CM

## 2019-08-27 DIAGNOSIS — F51.01 PRIMARY INSOMNIA: ICD-10-CM

## 2019-08-27 PROCEDURE — 99214 OFFICE O/P EST MOD 30 MIN: CPT | Performed by: NURSE PRACTITIONER

## 2019-08-27 RX ORDER — TRAZODONE HYDROCHLORIDE 100 MG/1
100 TABLET ORAL NIGHTLY
Qty: 30 TABLET | Refills: 5 | Status: SHIPPED | OUTPATIENT
Start: 2019-08-27 | End: 2020-02-07

## 2019-08-27 NOTE — PROGRESS NOTES
"Svitlana Fabian is a 59 y.o. male.  6-month follow-up.  Has been feeling good on Effexor but has been having increasing difficulty falling asleep at night.  \"I just toss and turn all night long.  I have been on Ambien in the past but I really do not want to take that again.  I did take trazodone and it seemed to really help well.\"    Anxiety   Presents for follow-up visit. Symptoms include insomnia. Patient reports no chest pain, confusion or shortness of breath. Symptoms occur occasionally. The severity of symptoms is moderate. The quality of sleep is good. Nighttime awakenings: none.     Compliance with medications is %.   Hypertension   This is a chronic problem. The current episode started more than 1 year ago. The problem is unchanged. The problem is controlled. Associated symptoms include anxiety. Pertinent negatives include no chest pain, orthopnea, peripheral edema or shortness of breath. There are no associated agents to hypertension. Risk factors for coronary artery disease include diabetes mellitus, male gender and obesity. Past treatments include ACE inhibitors and calcium channel blockers. Current antihypertension treatment includes calcium channel blockers and ACE inhibitors. The current treatment provides moderate improvement. Compliance problems include diet and exercise.    Insomnia   This is a chronic problem. The current episode started more than 1 month ago. The problem occurs constantly. The problem has been gradually worsening. Pertinent negatives include no chest pain, chills, diaphoresis, fatigue or fever.        The following portions of the patient's history were reviewed and updated as appropriate:     Current Outpatient Medications   Medication Sig Dispense Refill   • amLODIPine (NORVASC) 5 MG tablet Take 1 tablet by mouth Daily. 90 tablet 2   • cyclobenzaprine (FLEXERIL) 10 MG tablet Take 1 tablet by mouth 3 (Three) Times a Day As Needed for Muscle Spasms. 60 " "tablet 2   • lisinopril (PRINIVIL,ZESTRIL) 40 MG tablet TAKE 2 TABLETS BY MOUTH DAILY 90 tablet 0   • meloxicam (MOBIC) 15 MG tablet Take 1 tablet by mouth Daily. 30 tablet 2   • venlafaxine (EFFEXOR) 75 MG tablet Take 1 tablet by mouth 2 (Two) Times a Day. 180 tablet 2   • traZODone (DESYREL) 100 MG tablet Take 1 tablet by mouth Every Night. 30 tablet 5     No current facility-administered medications for this visit.      Current Outpatient Medications on File Prior to Visit   Medication Sig   • amLODIPine (NORVASC) 5 MG tablet Take 1 tablet by mouth Daily.   • cyclobenzaprine (FLEXERIL) 10 MG tablet Take 1 tablet by mouth 3 (Three) Times a Day As Needed for Muscle Spasms.   • lisinopril (PRINIVIL,ZESTRIL) 40 MG tablet TAKE 2 TABLETS BY MOUTH DAILY   • meloxicam (MOBIC) 15 MG tablet Take 1 tablet by mouth Daily.   • venlafaxine (EFFEXOR) 75 MG tablet Take 1 tablet by mouth 2 (Two) Times a Day.     No current facility-administered medications on file prior to visit.      He has No Known Allergies..    Review of Systems   Constitutional: Negative.  Negative for chills, diaphoresis, fatigue and fever.   HENT: Negative.    Eyes: Negative.    Respiratory: Negative.  Negative for shortness of breath.    Cardiovascular: Negative.  Negative for chest pain and orthopnea.   Gastrointestinal: Negative.    Genitourinary: Negative.    Musculoskeletal: Negative.    Skin: Negative.    Neurological: Negative.    Psychiatric/Behavioral: Negative for confusion. The patient has insomnia.        Objective    Visit Vitals  /86   Ht 177.8 cm (70\")   Wt 117 kg (257 lb 6.4 oz)   BMI 36.93 kg/m²       Physical Exam   Constitutional: He is oriented to person, place, and time. He appears well-developed and well-nourished.   HENT:   Head: Normocephalic.   Right Ear: External ear normal.   Left Ear: External ear normal.   Eyes: EOM are normal. Pupils are equal, round, and reactive to light.   Neck: Normal range of motion. Neck supple. "   Cardiovascular: Normal rate, regular rhythm and normal heart sounds.   Pulmonary/Chest: Effort normal and breath sounds normal.   Abdominal: Soft. Bowel sounds are normal.   Musculoskeletal: Normal range of motion.   Neurological: He is alert and oriented to person, place, and time.   Skin: Skin is warm. Capillary refill takes less than 2 seconds.   Psychiatric: He has a normal mood and affect. His behavior is normal.   Nursing note and vitals reviewed.      Assessment/Plan   Problems Addressed this Visit        Cardiovascular and Mediastinum    Essential hypertension - Primary       Other    Anxiety      Other Visit Diagnoses     Primary insomnia        Relevant Medications    traZODone (DESYREL) 100 MG tablet    Screening for colon cancer        Relevant Orders    Cologuard - Stool, Per Rectum        New Medications Ordered This Visit   Medications   • traZODone (DESYREL) 100 MG tablet     Sig: Take 1 tablet by mouth Every Night.     Dispense:  30 tablet     Refill:  5     1.  Essential hypertension:  Continue on amlodipine and lisinopril as previously prescribed and refill prescription sent to pharmacy  Complete CBC and chemistry panel as ordered and will notify of results when available  Encouraged to reduce sodium intake to no more than 2000 mg per day  Encouraged increased physical activity regimen such as walking or biking in order to promote cardiovascular health     2.  Anxiety:  Continue on Effexor as previously prescribed and refill prescription sent to pharmacy  Encouraged to seek emergency medical treatment for any new or worsening signs of anxiety or depression such as suicidal or homicidal ideations     3.  Primary insomnia:  Begin trazodone as prescribed to be taken 1 p.o. nightly  Educated on possible side of this medication including but not limited possible suicidal ideations  Encouraged to discontinue medication immediately if suicidal ideations occur and seek emergency medical treatment    4.   Screening for colon cancer:  Order placed for Cologuard and will notify results when available    Continue on current medications as previously prescribed   Strongly encouraged to complete fasting lab work in the next 10 to 14 days and will notify results when available  Return in about 6 months (around 2/27/2020) for Annual physical.        This document has been electronically signed by ISABEL Reynolds on August 27, 2019 3:02 PM

## 2019-09-02 DIAGNOSIS — F41.9 ANXIETY: ICD-10-CM

## 2019-09-03 RX ORDER — VENLAFAXINE 75 MG/1
TABLET ORAL
Qty: 180 TABLET | Refills: 2 | Status: SHIPPED | OUTPATIENT
Start: 2019-09-03 | End: 2020-05-07

## 2019-11-14 ENCOUNTER — TELEPHONE (OUTPATIENT)
Dept: FAMILY MEDICINE CLINIC | Facility: CLINIC | Age: 59
End: 2019-11-14

## 2019-11-14 DIAGNOSIS — M54.41 ACUTE RIGHT-SIDED LOW BACK PAIN WITH RIGHT-SIDED SCIATICA: ICD-10-CM

## 2019-11-14 DIAGNOSIS — I10 ESSENTIAL HYPERTENSION: ICD-10-CM

## 2019-11-14 RX ORDER — MELOXICAM 15 MG/1
15 TABLET ORAL DAILY
Qty: 90 TABLET | Refills: 2 | Status: SHIPPED | OUTPATIENT
Start: 2019-11-14 | End: 2020-08-05

## 2019-11-14 RX ORDER — AMLODIPINE BESYLATE 5 MG/1
5 TABLET ORAL DAILY
Qty: 90 TABLET | Refills: 2 | Status: SHIPPED | OUTPATIENT
Start: 2019-11-14 | End: 2020-09-16 | Stop reason: SDUPTHER

## 2019-11-14 RX ORDER — LISINOPRIL 40 MG/1
80 TABLET ORAL DAILY
Qty: 180 TABLET | Refills: 2 | Status: SHIPPED | OUTPATIENT
Start: 2019-11-14 | End: 2020-08-05

## 2019-11-14 NOTE — TELEPHONE ENCOUNTER
Pt needs refills on his mobic, lisinopril, and amlodipine called into [facility].

## 2020-02-07 DIAGNOSIS — F51.01 PRIMARY INSOMNIA: ICD-10-CM

## 2020-02-07 RX ORDER — TRAZODONE HYDROCHLORIDE 100 MG/1
100 TABLET ORAL NIGHTLY
Qty: 30 TABLET | Refills: 5 | Status: SHIPPED | OUTPATIENT
Start: 2020-02-07 | End: 2020-08-05

## 2020-05-07 DIAGNOSIS — F41.9 ANXIETY: ICD-10-CM

## 2020-05-07 RX ORDER — VENLAFAXINE 75 MG/1
TABLET ORAL
Qty: 180 TABLET | Refills: 0 | Status: SHIPPED | OUTPATIENT
Start: 2020-05-07 | End: 2020-08-05

## 2020-06-25 NOTE — TELEPHONE ENCOUNTER
----- Message from Amisha Garcia sent at 3/13/2017 10:54 AM CDT -----  Regarding: refill  Susan medina  Pt is in [place] and needs refill of his Amlodipine sent to [facility] in [place], [place]. This pharmacy is one of them that is in his preferred pharmacy list. Phone is [phone].   Initiate Treatment: Vanicream HC to affected areas twice daily until resolved Modify Regimen: Epiduo (start when things have calmed down and only apply to nose and forehead) three times weekly at night after cleansing  (mix it with Vanicream NOT the Vanicream HC) Plan: Reaction started when he began Epiduo, will decrease usage and mix with Vanicream. Detail Level: Zone Samples Given: Vanicream \\nVanicream HC

## 2020-08-05 DIAGNOSIS — F51.01 PRIMARY INSOMNIA: ICD-10-CM

## 2020-08-05 DIAGNOSIS — I10 ESSENTIAL HYPERTENSION: ICD-10-CM

## 2020-08-05 DIAGNOSIS — F41.9 ANXIETY: ICD-10-CM

## 2020-08-05 DIAGNOSIS — M54.41 ACUTE RIGHT-SIDED LOW BACK PAIN WITH RIGHT-SIDED SCIATICA: ICD-10-CM

## 2020-08-05 RX ORDER — VENLAFAXINE 75 MG/1
TABLET ORAL
Qty: 180 TABLET | Refills: 0 | Status: SHIPPED | OUTPATIENT
Start: 2020-08-05 | End: 2020-09-16

## 2020-08-05 RX ORDER — MELOXICAM 15 MG/1
TABLET ORAL
Qty: 90 TABLET | Refills: 2 | Status: SHIPPED | OUTPATIENT
Start: 2020-08-05 | End: 2020-09-16 | Stop reason: SDUPTHER

## 2020-08-05 RX ORDER — TRAZODONE HYDROCHLORIDE 100 MG/1
100 TABLET ORAL NIGHTLY
Qty: 30 TABLET | Refills: 5 | Status: SHIPPED | OUTPATIENT
Start: 2020-08-05 | End: 2020-09-16 | Stop reason: SDUPTHER

## 2020-08-05 RX ORDER — LISINOPRIL 40 MG/1
TABLET ORAL
Qty: 180 TABLET | Refills: 2 | Status: SHIPPED | OUTPATIENT
Start: 2020-08-05 | End: 2020-09-16 | Stop reason: SDUPTHER

## 2020-09-02 ENCOUNTER — OFFICE VISIT (OUTPATIENT)
Dept: SLEEP MEDICINE | Facility: HOSPITAL | Age: 60
End: 2020-09-02

## 2020-09-02 DIAGNOSIS — G47.33 OBSTRUCTIVE SLEEP APNEA, ADULT: Primary | ICD-10-CM

## 2020-09-02 DIAGNOSIS — F51.04 PSYCHOPHYSIOLOGICAL INSOMNIA: ICD-10-CM

## 2020-09-02 PROCEDURE — 99441 PR PHYS/QHP TELEPHONE EVALUATION 5-10 MIN: CPT | Performed by: NURSE PRACTITIONER

## 2020-09-02 NOTE — PROGRESS NOTES
Sleep Clinic Follow Up      You have chosen to receive care through a telephone visit. Do you consent to use a telephone visit for your medical care today? Yes    Date: 2020  Primary Care Physician: Afshan Falcon APRN    Last office visit: 2017 (I reviewed this note)    CC: Follow up: MADYSON on CPAP      Interim History:  Since the last visit:    1) severe MADYSON -  James Fabian has reportedly remained compliant with CPAP. He denies mask and machine issues, dry mouth, headaches, or pressures intolerance. He denies abnormal dreams, sleep paralysis, nasal congestion, URI sx.    Sleep Testin. PSG on 2008, AHI of 70   2. CPAP recommended 13 cm H2O   3. Currently on 13 cm H2O (?)    PAP Data:    Data unavailable  Mask type: Full face mask  DME: [facility]    Bed time: 2188-5698  Sleep latency: <30 minutes  Number of times awakens during the night: 1-2  Wake time: 4634-5175  Estimated total sleep time at night: 7-8 hours  Caffeine intake: 0 cups of coffee, 0-1 cups of decaf tea, and 0-1 sodas per day  Alcohol intake: 0-1 drinks per week  Nap time: none   Sleepiness with Driving: none       2) Patient denies RLS symptoms.     3) Insomnia - Currently taking trazodone 100 mg QHS with good control of symptoms.    PMHx, FH, SH reviewed and pertinent changes are: unchanged from last office visit on 2017      REVIEW OF SYSTEMS:   Negative for chest pain, SOA, fever, chills, cough, N/V/D, abdominal pain.    Smoking:none    Exam:  Unable to perform physical exam due to conducting telephone visit    Past Medical History:   Diagnosis Date   • Acute bronchitis 2016   • Allergic rhinitis 2016   • Anxiety 2016   • Backache 2013   • Chronic bronchitis (CMS/HCC)    • Common cold 1920   • Cough 2013   • Depressive disorder 5520   • Elevated blood-pressure reading without diagnosis of hypertension 4192   • Essential hypertension 5520   • Generalized muscle ache    •  Insomnia 591867   • MADYSON (obstructive sleep apnea)    • Osteoarthritis of multiple joints 10/19/2011   • Overweight    • Shoulder pain [id number]   • SI (sacroiliac) joint inflammation (CMS/HCC) 603567   • URI (upper respiratory infection) 935357       Current Outpatient Medications:   •  amLODIPine (NORVASC) 5 MG tablet, Take 1 tablet by mouth Daily., Disp: 90 tablet, Rfl: 2  •  cyclobenzaprine (FLEXERIL) 10 MG tablet, Take 1 tablet by mouth 3 (Three) Times a Day As Needed for Muscle Spasms., Disp: 60 tablet, Rfl: 2  •  ipratropium (ATROVENT) 0.06 % nasal spray, 2 sprays each nostril 3-4 times a day for cold symptoms., Disp: 1 each, Rfl: 0  •  lisinopril (PRINIVIL,ZESTRIL) 40 MG tablet, TAKE TWO TABLETS BY MOUTH EVERY DAY, Disp: 180 tablet, Rfl: 2  •  meloxicam (MOBIC) 15 MG tablet, TAKE 1 TABLET BY MOUTH EVERY DAY, Disp: 90 tablet, Rfl: 2  •  traZODone (DESYREL) 100 MG tablet, TAKE 1 TABLET BY MOUTH EVERY NIGHT, Disp: 30 tablet, Rfl: 5  •  venlafaxine (EFFEXOR) 75 MG tablet, TAKE 1 TABLET BY MOUTH TWICE DAILY, Disp: 180 tablet, Rfl: 0      Assessment and Plan:    1. Obstructive sleep apnea Established, stable (1)  1. Reportedly compliant with PAP therapy - obtain compliance report; patient was instructed to take data card to DME when picking up supplies  2. Continue PAP as prescribed  3. Script for PAP supplies  4. Return to clinic in 6 months with compliance report unless change in symptoms in interim period  2. Insomnia - sleep onset and or maintenance Established, stable (1)    1.   Stable on trazodone      This visit has been rescheduled as a phone visit to comply with patient safety concerns in accordance with CDC recommendations. Total time of discussion was 7 minutes.    4 of 7 minutes spent telephone counseling patient extensively regarding:   PAP therapy, PAP compliance and PAP maintenance      RTC in 6 months. Patient agrees to return sooner if changes in symptoms.        This document has been  electronically signed by ISABEL Perez on September 2, 2020 14:18          CC: Afshan Falcon APRN          No ref. provider found

## 2020-09-02 NOTE — PROGRESS NOTES
You have chosen to receive care through a telephone visit. Do you consent to use a telephone visit for your medical care today?yes

## 2020-09-16 ENCOUNTER — OFFICE VISIT (OUTPATIENT)
Dept: FAMILY MEDICINE CLINIC | Facility: CLINIC | Age: 60
End: 2020-09-16

## 2020-09-16 VITALS
HEIGHT: 70 IN | DIASTOLIC BLOOD PRESSURE: 66 MMHG | BODY MASS INDEX: 37.37 KG/M2 | SYSTOLIC BLOOD PRESSURE: 129 MMHG | WEIGHT: 261 LBS

## 2020-09-16 DIAGNOSIS — M54.41 CHRONIC RIGHT-SIDED LOW BACK PAIN WITH RIGHT-SIDED SCIATICA: ICD-10-CM

## 2020-09-16 DIAGNOSIS — F41.9 ANXIETY: ICD-10-CM

## 2020-09-16 DIAGNOSIS — I10 ESSENTIAL HYPERTENSION: Primary | ICD-10-CM

## 2020-09-16 DIAGNOSIS — G89.29 CHRONIC RIGHT-SIDED LOW BACK PAIN WITH RIGHT-SIDED SCIATICA: ICD-10-CM

## 2020-09-16 DIAGNOSIS — F51.01 PRIMARY INSOMNIA: ICD-10-CM

## 2020-09-16 PROCEDURE — 99442 PR PHYS/QHP TELEPHONE EVALUATION 11-20 MIN: CPT | Performed by: NURSE PRACTITIONER

## 2020-09-16 RX ORDER — VENLAFAXINE HYDROCHLORIDE 150 MG/1
150 CAPSULE, EXTENDED RELEASE ORAL DAILY
Qty: 90 CAPSULE | Refills: 2 | Status: SHIPPED | OUTPATIENT
Start: 2020-09-16 | End: 2021-02-10 | Stop reason: SDUPTHER

## 2020-09-16 RX ORDER — CYCLOBENZAPRINE HCL 10 MG
10 TABLET ORAL 3 TIMES DAILY PRN
Qty: 60 TABLET | Refills: 2 | Status: SHIPPED | OUTPATIENT
Start: 2020-09-16 | End: 2020-12-21

## 2020-09-16 RX ORDER — MELOXICAM 15 MG/1
15 TABLET ORAL DAILY
Qty: 90 TABLET | Refills: 2 | Status: SHIPPED | OUTPATIENT
Start: 2020-09-16 | End: 2021-02-10 | Stop reason: SDUPTHER

## 2020-09-16 RX ORDER — LISINOPRIL 40 MG/1
80 TABLET ORAL DAILY
Qty: 180 TABLET | Refills: 2 | Status: SHIPPED | OUTPATIENT
Start: 2020-09-16 | End: 2021-02-10 | Stop reason: SDUPTHER

## 2020-09-16 RX ORDER — AMLODIPINE BESYLATE 5 MG/1
5 TABLET ORAL DAILY
Qty: 90 TABLET | Refills: 2 | Status: SHIPPED | OUTPATIENT
Start: 2020-09-16 | End: 2021-02-10 | Stop reason: SDUPTHER

## 2020-09-16 RX ORDER — TRAZODONE HYDROCHLORIDE 100 MG/1
100 TABLET ORAL NIGHTLY
Qty: 30 TABLET | Refills: 5 | Status: SHIPPED | OUTPATIENT
Start: 2020-09-16 | End: 2021-02-10 | Stop reason: SDUPTHER

## 2020-09-16 NOTE — PROGRESS NOTES
Subjective   James Fabian is a 60 y.o. male.  Follow-up for high blood pressure, anxiety, insomnia and chronic back pain.  Needs refills on his medications.    Anxiety  Presents for follow-up visit. Symptoms include insomnia. Patient reports no chest pain, confusion or shortness of breath. Symptoms occur occasionally. The severity of symptoms is moderate. The quality of sleep is good. Nighttime awakenings: none.     Compliance with medications is %.   Hypertension  This is a chronic problem. The current episode started more than 1 year ago. The problem is unchanged. The problem is controlled. Associated symptoms include anxiety. Pertinent negatives include no chest pain, orthopnea, peripheral edema or shortness of breath. There are no associated agents to hypertension. Risk factors for coronary artery disease include diabetes mellitus, male gender and obesity. Past treatments include ACE inhibitors and calcium channel blockers. Current antihypertension treatment includes calcium channel blockers and ACE inhibitors. The current treatment provides moderate improvement. Compliance problems include diet and exercise.    Insomnia  This is a chronic problem. The current episode started more than 1 month ago. The problem occurs constantly. The problem has been gradually worsening. Pertinent negatives include no chest pain, chills, diaphoresis, fatigue or fever.   Back Pain  This is a chronic problem. The current episode started more than 1 year ago. The problem occurs constantly. The problem is unchanged. The pain is present in the lumbar spine. The pain radiates to the right thigh. The pain is at a severity of 8/10. The pain is moderate. The symptoms are aggravated by position. Pertinent negatives include no chest pain or fever. He has tried heat, home exercises and NSAIDs for the symptoms. The treatment provided moderate relief.        The following portions of the patient's history were reviewed and updated  "as appropriate:   Current Outpatient Medications   Medication Sig Dispense Refill   • amLODIPine (NORVASC) 5 MG tablet Take 1 tablet by mouth Daily. 90 tablet 2   • cyclobenzaprine (FLEXERIL) 10 MG tablet Take 1 tablet by mouth 3 (Three) Times a Day As Needed for Muscle Spasms. 60 tablet 2   • ipratropium (ATROVENT) 0.06 % nasal spray 2 sprays each nostril 3-4 times a day for cold symptoms. 1 each 0   • lisinopril (PRINIVIL,ZESTRIL) 40 MG tablet Take 2 tablets by mouth Daily. 180 tablet 2   • meloxicam (MOBIC) 15 MG tablet Take 1 tablet by mouth Daily. 90 tablet 2   • traZODone (DESYREL) 100 MG tablet Take 1 tablet by mouth Every Night. 30 tablet 5   • venlafaxine XR (EFFEXOR-XR) 150 MG 24 hr capsule Take 1 capsule by mouth Daily. 90 capsule 2     No current facility-administered medications for this visit.      Current Outpatient Medications on File Prior to Visit   Medication Sig   • ipratropium (ATROVENT) 0.06 % nasal spray 2 sprays each nostril 3-4 times a day for cold symptoms.     No current facility-administered medications on file prior to visit.      He has No Known Allergies..    Review of Systems   Constitutional: Negative.  Negative for chills, diaphoresis, fatigue and fever.   HENT: Negative.    Respiratory: Negative.  Negative for shortness of breath.    Cardiovascular: Negative.  Negative for chest pain and orthopnea.   Gastrointestinal: Negative.    Genitourinary: Negative.    Musculoskeletal: Positive for back pain.   Skin: Negative.    Neurological: Negative.    Psychiatric/Behavioral: Negative for confusion. The patient has insomnia.        Objective    Visit Vitals  /66   Ht 177.8 cm (70\")   Wt 118 kg (261 lb)   BMI 37.45 kg/m²     Vitals signs taken at home by patient     Physical Exam  Neurological:      Mental Status: He is alert and oriented to person, place, and time.         Assessment/Plan   Problems Addressed this Visit        Cardiovascular and Mediastinum    Essential hypertension " - Primary    Relevant Medications    amLODIPine (NORVASC) 5 MG tablet    lisinopril (PRINIVIL,ZESTRIL) 40 MG tablet       Nervous and Auditory    Acute right-sided low back pain with right-sided sciatica    Relevant Medications    cyclobenzaprine (FLEXERIL) 10 MG tablet    meloxicam (MOBIC) 15 MG tablet       Other    Anxiety    Relevant Medications    venlafaxine XR (EFFEXOR-XR) 150 MG 24 hr capsule      Other Visit Diagnoses     Primary insomnia        Relevant Medications    traZODone (DESYREL) 100 MG tablet        New Medications Ordered This Visit   Medications   • amLODIPine (NORVASC) 5 MG tablet     Sig: Take 1 tablet by mouth Daily.     Dispense:  90 tablet     Refill:  2   • cyclobenzaprine (FLEXERIL) 10 MG tablet     Sig: Take 1 tablet by mouth 3 (Three) Times a Day As Needed for Muscle Spasms.     Dispense:  60 tablet     Refill:  2   • lisinopril (PRINIVIL,ZESTRIL) 40 MG tablet     Sig: Take 2 tablets by mouth Daily.     Dispense:  180 tablet     Refill:  2   • meloxicam (MOBIC) 15 MG tablet     Sig: Take 1 tablet by mouth Daily.     Dispense:  90 tablet     Refill:  2   • traZODone (DESYREL) 100 MG tablet     Sig: Take 1 tablet by mouth Every Night.     Dispense:  30 tablet     Refill:  5   • venlafaxine XR (EFFEXOR-XR) 150 MG 24 hr capsule     Sig: Take 1 capsule by mouth Daily.     Dispense:  90 capsule     Refill:  2     1.  Essential hypertension:  Continue on Norvasc and lisinopril as previously prescribed and refill prescription sent to pharmacy  Encouraged to continue to adhere to low-sodium diet of no more than 2000 mg/day  Discussed ways to reduce sodium intake in diet such as avoiding high sodium content foods such as potato chips, cottage cheese, processed meat  Encouraged use of a salt substitute such as Mrs. Ugarte as opposed to table salt    2.  Anxiety:  Continue on Effexor as previously prescribed  Discuss stress relieving techniques such as deep breathing, meditation and guided  imagery  Reeducated on possible side effects of this medication including but limited to increased risk for worsening of depression or thoughts of self-harm    3.  Primary insomnia:  Continue on trazodone as previously prescribed and refill prescription sent to pharmacy  Encouraged to continue to adhere to regular bedtime routine weekly return to golf all electronic devices including television and cell phone and bedroom at night  Encouraged frequent rest periods throughout the day  May use chamomile or lavender tea OTC at night to help promote relaxation    4.  Acute right-sided low back pain with right-sided sciatica:  Continue on Flexeril and meloxicam as previously prescribed and refill prescription sent to pharmacy  Reeducated on possible side effects of these medications including not limited to increased risk for drowsiness, gastrointestinal bleeding and discomfort  Encouraged light lumbar PT exercises to help improve core strength    You have chosen to receive care through a telephone visit. Do you consent to use a telephone visit for your medical care today? Yes  This visit has been rescheduled as a phone visit to comply with patient safety concerns in accordance with CDC recommendations. Total time of discussion was 16 minutes.  Continue on current medications as previously prescribed   Return in about 3 months (around 12/16/2020) for Annual physical.        This document has been electronically signed by ISABEL Reynolds on September 17, 2020 14:23 CDT   3

## 2020-09-17 PROBLEM — M54.41 ACUTE RIGHT-SIDED LOW BACK PAIN WITH RIGHT-SIDED SCIATICA: Status: ACTIVE | Noted: 2020-09-17

## 2020-12-20 DIAGNOSIS — M54.41 CHRONIC RIGHT-SIDED LOW BACK PAIN WITH RIGHT-SIDED SCIATICA: ICD-10-CM

## 2020-12-20 DIAGNOSIS — G89.29 CHRONIC RIGHT-SIDED LOW BACK PAIN WITH RIGHT-SIDED SCIATICA: ICD-10-CM

## 2020-12-21 RX ORDER — CYCLOBENZAPRINE HCL 10 MG
TABLET ORAL
Qty: 60 TABLET | Refills: 2 | Status: SHIPPED | OUTPATIENT
Start: 2020-12-21 | End: 2021-02-10 | Stop reason: SDUPTHER

## 2021-02-10 ENCOUNTER — TELEMEDICINE (OUTPATIENT)
Dept: FAMILY MEDICINE CLINIC | Facility: CLINIC | Age: 61
End: 2021-02-10

## 2021-02-10 VITALS
WEIGHT: 272 LBS | SYSTOLIC BLOOD PRESSURE: 127 MMHG | BODY MASS INDEX: 39.03 KG/M2 | DIASTOLIC BLOOD PRESSURE: 60 MMHG | HEART RATE: 84 BPM

## 2021-02-10 DIAGNOSIS — F41.9 ANXIETY: ICD-10-CM

## 2021-02-10 DIAGNOSIS — I10 ESSENTIAL HYPERTENSION: ICD-10-CM

## 2021-02-10 DIAGNOSIS — G89.29 CHRONIC RIGHT-SIDED LOW BACK PAIN WITH RIGHT-SIDED SCIATICA: ICD-10-CM

## 2021-02-10 DIAGNOSIS — Z13.29 SCREENING FOR HYPOTHYROIDISM: ICD-10-CM

## 2021-02-10 DIAGNOSIS — M54.41 CHRONIC RIGHT-SIDED LOW BACK PAIN WITH RIGHT-SIDED SCIATICA: ICD-10-CM

## 2021-02-10 DIAGNOSIS — Z12.11 SCREENING FOR COLON CANCER: ICD-10-CM

## 2021-02-10 DIAGNOSIS — Z00.00 ANNUAL PHYSICAL EXAM: Primary | ICD-10-CM

## 2021-02-10 DIAGNOSIS — Z13.220 SCREENING FOR HYPERCHOLESTEROLEMIA: ICD-10-CM

## 2021-02-10 DIAGNOSIS — F51.01 PRIMARY INSOMNIA: ICD-10-CM

## 2021-02-10 PROCEDURE — 99396 PREV VISIT EST AGE 40-64: CPT | Performed by: NURSE PRACTITIONER

## 2021-02-10 RX ORDER — VENLAFAXINE HYDROCHLORIDE 150 MG/1
150 CAPSULE, EXTENDED RELEASE ORAL DAILY
Qty: 90 CAPSULE | Refills: 2 | Status: SHIPPED | OUTPATIENT
Start: 2021-02-10 | End: 2022-08-03 | Stop reason: SDUPTHER

## 2021-02-10 RX ORDER — TRAZODONE HYDROCHLORIDE 100 MG/1
100 TABLET ORAL NIGHTLY
Qty: 30 TABLET | Refills: 5 | Status: SHIPPED | OUTPATIENT
Start: 2021-02-10 | End: 2021-08-10 | Stop reason: DRUGHIGH

## 2021-02-10 RX ORDER — TRAZODONE HYDROCHLORIDE 150 MG/1
150 TABLET ORAL NIGHTLY
Qty: 90 TABLET | Refills: 3 | Status: SHIPPED | OUTPATIENT
Start: 2021-02-10 | End: 2022-03-02 | Stop reason: SDUPTHER

## 2021-02-10 RX ORDER — LISINOPRIL 40 MG/1
80 TABLET ORAL DAILY
Qty: 180 TABLET | Refills: 2 | Status: SHIPPED | OUTPATIENT
Start: 2021-02-10 | End: 2022-08-03 | Stop reason: SDUPTHER

## 2021-02-10 RX ORDER — AMLODIPINE BESYLATE 5 MG/1
5 TABLET ORAL DAILY
Qty: 90 TABLET | Refills: 2 | Status: SHIPPED | OUTPATIENT
Start: 2021-02-10 | End: 2022-08-03 | Stop reason: SDUPTHER

## 2021-02-10 RX ORDER — CYCLOBENZAPRINE HCL 10 MG
10 TABLET ORAL 3 TIMES DAILY PRN
Qty: 90 TABLET | Refills: 2 | Status: SHIPPED | OUTPATIENT
Start: 2021-02-10 | End: 2021-07-28

## 2021-02-10 RX ORDER — MELOXICAM 15 MG/1
15 TABLET ORAL DAILY
Qty: 90 TABLET | Refills: 2 | Status: SHIPPED | OUTPATIENT
Start: 2021-02-10 | End: 2022-08-03 | Stop reason: SDUPTHER

## 2021-02-10 NOTE — PROGRESS NOTES
"Subjective   James Fabian is a 61 y.o. male. Annual physical exam.  Has high blood pressure, anxiety, chronic back pain and insomnia.  Currently on trazodone for insomnia.  \"It was really working at first but now it does not seem to be doing so well.  If there are any way we can increase the dose?\"    HPI:  Annual physical exam     Anxiety  Presents for follow-up visit. Symptoms include insomnia. Patient reports no confusion or shortness of breath. Symptoms occur occasionally. The severity of symptoms is moderate. The quality of sleep is good. Nighttime awakenings: none.     Compliance with medications is %.   Hypertension  This is a chronic problem. The current episode started more than 1 year ago. The problem is unchanged. The problem is controlled. Associated symptoms include anxiety. Pertinent negatives include no peripheral edema or shortness of breath. There are no associated agents to hypertension. Risk factors for coronary artery disease include diabetes mellitus, male gender and obesity. Past treatments include ACE inhibitors and calcium channel blockers. Current antihypertension treatment includes calcium channel blockers and ACE inhibitors. The current treatment provides moderate improvement. Compliance problems include diet and exercise.    Insomnia  This is a chronic problem. The current episode started more than 1 month ago. The problem occurs constantly. The problem has been unchanged. Pertinent negatives include no chills, coughing, diaphoresis, fatigue, fever or sore throat. Nothing aggravates the symptoms. Treatments tried: Trazodone. The treatment provided mild relief.   Back Pain  This is a chronic problem. The current episode started more than 1 year ago. The problem occurs constantly. The problem is unchanged. The pain is present in the lumbar spine. The pain radiates to the right thigh. The pain is at a severity of 8/10. The pain is moderate. The symptoms are aggravated by " position. Pertinent negatives include no fever. He has tried heat, home exercises, NSAIDs and muscle relaxant for the symptoms. The treatment provided moderate relief.        The following portions of the patient's history were reviewed and updated as appropriate:   Current Outpatient Medications   Medication Sig Dispense Refill   • amLODIPine (NORVASC) 5 MG tablet Take 1 tablet by mouth Daily. 90 tablet 2   • cyclobenzaprine (FLEXERIL) 10 MG tablet Take 1 tablet by mouth 3 (Three) Times a Day As Needed for Muscle Spasms. for muscle spams 90 tablet 2   • ipratropium (ATROVENT) 0.06 % nasal spray 2 sprays each nostril 3-4 times a day for cold symptoms. 1 each 0   • lisinopril (PRINIVIL,ZESTRIL) 40 MG tablet Take 2 tablets by mouth Daily. 180 tablet 2   • meloxicam (MOBIC) 15 MG tablet Take 1 tablet by mouth Daily. 90 tablet 2   • traZODone (DESYREL) 100 MG tablet Take 1 tablet by mouth Every Night. 30 tablet 5   • traZODone (DESYREL) 150 MG tablet Take 1 tablet by mouth Every Night. 90 tablet 3   • venlafaxine XR (EFFEXOR-XR) 150 MG 24 hr capsule Take 1 capsule by mouth Daily. 90 capsule 2     No current facility-administered medications for this visit.      Current Outpatient Medications on File Prior to Visit   Medication Sig   • ipratropium (ATROVENT) 0.06 % nasal spray 2 sprays each nostril 3-4 times a day for cold symptoms.   • [DISCONTINUED] amLODIPine (NORVASC) 5 MG tablet Take 1 tablet by mouth Daily.   • [DISCONTINUED] cyclobenzaprine (FLEXERIL) 10 MG tablet TAKE 1 TABLET BY MOUTH THREE TIMES DAILY AS NEEDED FOR MUSCLE SPASMS   • [DISCONTINUED] lisinopril (PRINIVIL,ZESTRIL) 40 MG tablet Take 2 tablets by mouth Daily.   • [DISCONTINUED] meloxicam (MOBIC) 15 MG tablet Take 1 tablet by mouth Daily.   • [DISCONTINUED] traZODone (DESYREL) 100 MG tablet Take 1 tablet by mouth Every Night.   • [DISCONTINUED] venlafaxine XR (EFFEXOR-XR) 150 MG 24 hr capsule Take 1 capsule by mouth Daily.     No current  facility-administered medications on file prior to visit.      He has No Known Allergies..    Review of Systems   Constitutional: Negative.  Negative for chills, diaphoresis, fatigue and fever.   HENT: Negative.  Negative for sore throat.    Eyes: Negative.    Respiratory: Negative.  Negative for cough and shortness of breath.    Cardiovascular: Negative.    Gastrointestinal: Negative.    Genitourinary: Negative.    Musculoskeletal: Positive for back pain.   Skin: Negative.    Neurological: Negative.    Psychiatric/Behavioral: Negative for confusion. The patient has insomnia.        Objective    Visit Vitals  /60 Comment: Pt. reported   Pulse 84 Comment: Pt. reported   Wt 123 kg (272 lb) Comment: Pt. reported   BMI 39.03 kg/m²       Physical Exam  Vitals signs reviewed.   Constitutional:       Appearance: Normal appearance. He is morbidly obese.      Comments: Pt. assisted physical exam.    HENT:      Head: Normocephalic and atraumatic.   Eyes:      Conjunctiva/sclera: Conjunctivae normal.   Neck:      Musculoskeletal: Normal range of motion.   Pulmonary:      Effort: Pulmonary effort is normal.   Abdominal:      Palpations: Abdomen is soft.   Skin:     General: Skin is warm.   Neurological:      Mental Status: He is alert and oriented to person, place, and time.   Psychiatric:         Behavior: Behavior normal.         Assessment/Plan   Diagnoses and all orders for this visit:    1. Annual physical exam (Primary)    2. Essential hypertension  -     CBC & Differential; Future  -     Comprehensive Metabolic Panel; Future  -     amLODIPine (NORVASC) 5 MG tablet; Take 1 tablet by mouth Daily.  Dispense: 90 tablet; Refill: 2  -     lisinopril (PRINIVIL,ZESTRIL) 40 MG tablet; Take 2 tablets by mouth Daily.  Dispense: 180 tablet; Refill: 2    3. Chronic right-sided low back pain with right-sided sciatica  -     cyclobenzaprine (FLEXERIL) 10 MG tablet; Take 1 tablet by mouth 3 (Three) Times a Day As Needed for Muscle  Spasms. for muscle spams  Dispense: 90 tablet; Refill: 2  -     meloxicam (MOBIC) 15 MG tablet; Take 1 tablet by mouth Daily.  Dispense: 90 tablet; Refill: 2    4. Anxiety  -     venlafaxine XR (EFFEXOR-XR) 150 MG 24 hr capsule; Take 1 capsule by mouth Daily.  Dispense: 90 capsule; Refill: 2    5. Primary insomnia  -     traZODone (DESYREL) 150 MG tablet; Take 1 tablet by mouth Every Night.  Dispense: 90 tablet; Refill: 3  -     traZODone (DESYREL) 100 MG tablet; Take 1 tablet by mouth Every Night.  Dispense: 30 tablet; Refill: 5    6. Screening for colon cancer  -     Cologuard - Stool, Per Rectum; Future    7. Screening for hypothyroidism  -     TSH; Future    8. Screening for hypercholesterolemia  -     Lipid Panel; Future      Encouraged adhere to a low-sodium/low fat diet  Encouraged to cook from scratch at home in order to help reduce saturated fats and sodium content in food  Discussed the importance of regular physical activity regimen and not only promoting cardiovascular health but also weight loss and improving quality of sleep  Discussed importance of completing Cologuard kit for colon cancer screening and if kit is not completed we will proceed with colonoscopy  Complete fasting lab work as ordered and will notify of results when available    You have chosen to receive care through a telehealth visit.  Do you consent to use a video/audio connection for your medical care today? Yes  Continue on current medications as previously prescribed   This was an audio and video enabled telemedicine encounter.  I spent 23 minutes charting and in video face to face contact with patient.  Greater than 50% of this time was spent counseling patient and discussing plan of care.  Return in about 6 months (around 8/10/2021) for Recheck.        This document has been electronically signed by ISABEL Reynolds on February 10, 2021 09:34 CST

## 2021-02-26 ENCOUNTER — IMMUNIZATION (OUTPATIENT)
Dept: VACCINE CLINIC | Facility: HOSPITAL | Age: 61
End: 2021-02-26

## 2021-02-26 PROCEDURE — 91300 HC SARSCOV02 VAC 30MCG/0.3ML IM: CPT | Performed by: THORACIC SURGERY (CARDIOTHORACIC VASCULAR SURGERY)

## 2021-02-26 PROCEDURE — 0001A: CPT | Performed by: THORACIC SURGERY (CARDIOTHORACIC VASCULAR SURGERY)

## 2021-03-19 ENCOUNTER — IMMUNIZATION (OUTPATIENT)
Dept: VACCINE CLINIC | Facility: HOSPITAL | Age: 61
End: 2021-03-19

## 2021-03-19 PROCEDURE — 0002A: CPT | Performed by: THORACIC SURGERY (CARDIOTHORACIC VASCULAR SURGERY)

## 2021-03-19 PROCEDURE — 91300 HC SARSCOV02 VAC 30MCG/0.3ML IM: CPT | Performed by: THORACIC SURGERY (CARDIOTHORACIC VASCULAR SURGERY)

## 2021-07-28 DIAGNOSIS — M54.41 CHRONIC RIGHT-SIDED LOW BACK PAIN WITH RIGHT-SIDED SCIATICA: ICD-10-CM

## 2021-07-28 DIAGNOSIS — G89.29 CHRONIC RIGHT-SIDED LOW BACK PAIN WITH RIGHT-SIDED SCIATICA: ICD-10-CM

## 2021-07-28 RX ORDER — CYCLOBENZAPRINE HCL 10 MG
TABLET ORAL
Qty: 90 TABLET | Refills: 2 | Status: SHIPPED | OUTPATIENT
Start: 2021-07-28 | End: 2022-08-03 | Stop reason: SDUPTHER

## 2021-08-03 PROCEDURE — 87635 SARS-COV-2 COVID-19 AMP PRB: CPT | Performed by: NURSE PRACTITIONER

## 2021-08-10 ENCOUNTER — LAB (OUTPATIENT)
Dept: LAB | Facility: HOSPITAL | Age: 61
End: 2021-08-10

## 2021-08-10 ENCOUNTER — OFFICE VISIT (OUTPATIENT)
Dept: FAMILY MEDICINE CLINIC | Facility: CLINIC | Age: 61
End: 2021-08-10

## 2021-08-10 VITALS
OXYGEN SATURATION: 95 % | WEIGHT: 267.4 LBS | SYSTOLIC BLOOD PRESSURE: 124 MMHG | HEART RATE: 82 BPM | BODY MASS INDEX: 38.28 KG/M2 | HEIGHT: 70 IN | DIASTOLIC BLOOD PRESSURE: 66 MMHG

## 2021-08-10 DIAGNOSIS — I10 ESSENTIAL HYPERTENSION: Primary | ICD-10-CM

## 2021-08-10 DIAGNOSIS — Z13.220 SCREENING FOR HYPERCHOLESTEROLEMIA: ICD-10-CM

## 2021-08-10 DIAGNOSIS — G89.29 CHRONIC RIGHT-SIDED LOW BACK PAIN WITH RIGHT-SIDED SCIATICA: ICD-10-CM

## 2021-08-10 DIAGNOSIS — N52.9 ERECTILE DYSFUNCTION, UNSPECIFIED ERECTILE DYSFUNCTION TYPE: ICD-10-CM

## 2021-08-10 DIAGNOSIS — I10 ESSENTIAL HYPERTENSION: ICD-10-CM

## 2021-08-10 DIAGNOSIS — Z23 NEED FOR SHINGLES VACCINE: ICD-10-CM

## 2021-08-10 DIAGNOSIS — Z13.29 SCREENING FOR HYPOTHYROIDISM: ICD-10-CM

## 2021-08-10 DIAGNOSIS — M54.41 CHRONIC RIGHT-SIDED LOW BACK PAIN WITH RIGHT-SIDED SCIATICA: ICD-10-CM

## 2021-08-10 DIAGNOSIS — F41.9 ANXIETY: ICD-10-CM

## 2021-08-10 DIAGNOSIS — F51.01 PRIMARY INSOMNIA: ICD-10-CM

## 2021-08-10 LAB
ALBUMIN SERPL-MCNC: 3.9 G/DL (ref 3.5–5.2)
ALBUMIN/GLOB SERPL: 1.5 G/DL
ALP SERPL-CCNC: 45 U/L (ref 39–117)
ALT SERPL W P-5'-P-CCNC: 22 U/L (ref 1–41)
ANION GAP SERPL CALCULATED.3IONS-SCNC: 9 MMOL/L (ref 5–15)
AST SERPL-CCNC: 16 U/L (ref 1–40)
BASOPHILS # BLD AUTO: 0.05 10*3/MM3 (ref 0–0.2)
BASOPHILS NFR BLD AUTO: 0.8 % (ref 0–1.5)
BILIRUB SERPL-MCNC: 0.3 MG/DL (ref 0–1.2)
BUN SERPL-MCNC: 14 MG/DL (ref 8–23)
BUN/CREAT SERPL: 12.7 (ref 7–25)
CALCIUM SPEC-SCNC: 8.6 MG/DL (ref 8.6–10.5)
CHLORIDE SERPL-SCNC: 103 MMOL/L (ref 98–107)
CHOLEST SERPL-MCNC: 170 MG/DL (ref 0–200)
CO2 SERPL-SCNC: 29 MMOL/L (ref 22–29)
CREAT SERPL-MCNC: 1.1 MG/DL (ref 0.76–1.27)
DEPRECATED RDW RBC AUTO: 43.1 FL (ref 37–54)
EOSINOPHIL # BLD AUTO: 0.2 10*3/MM3 (ref 0–0.4)
EOSINOPHIL NFR BLD AUTO: 3.1 % (ref 0.3–6.2)
ERYTHROCYTE [DISTWIDTH] IN BLOOD BY AUTOMATED COUNT: 13.8 % (ref 12.3–15.4)
GFR SERPL CREATININE-BSD FRML MDRD: 68 ML/MIN/1.73
GLOBULIN UR ELPH-MCNC: 2.6 GM/DL
GLUCOSE SERPL-MCNC: 98 MG/DL (ref 65–99)
HCT VFR BLD AUTO: 43.8 % (ref 37.5–51)
HDLC SERPL-MCNC: 53 MG/DL (ref 40–60)
HGB BLD-MCNC: 14.3 G/DL (ref 13–17.7)
IMM GRANULOCYTES # BLD AUTO: 0.04 10*3/MM3 (ref 0–0.05)
IMM GRANULOCYTES NFR BLD AUTO: 0.6 % (ref 0–0.5)
LDLC SERPL CALC-MCNC: 96 MG/DL (ref 0–100)
LDLC/HDLC SERPL: 1.77 {RATIO}
LYMPHOCYTES # BLD AUTO: 1.43 10*3/MM3 (ref 0.7–3.1)
LYMPHOCYTES NFR BLD AUTO: 22.3 % (ref 19.6–45.3)
MCH RBC QN AUTO: 28.1 PG (ref 26.6–33)
MCHC RBC AUTO-ENTMCNC: 32.6 G/DL (ref 31.5–35.7)
MCV RBC AUTO: 86.1 FL (ref 79–97)
MONOCYTES # BLD AUTO: 0.61 10*3/MM3 (ref 0.1–0.9)
MONOCYTES NFR BLD AUTO: 9.5 % (ref 5–12)
NEUTROPHILS NFR BLD AUTO: 4.08 10*3/MM3 (ref 1.7–7)
NEUTROPHILS NFR BLD AUTO: 63.7 % (ref 42.7–76)
NRBC BLD AUTO-RTO: 0 /100 WBC (ref 0–0.2)
PLATELET # BLD AUTO: 221 10*3/MM3 (ref 140–450)
PMV BLD AUTO: 9.5 FL (ref 6–12)
POTASSIUM SERPL-SCNC: 4 MMOL/L (ref 3.5–5.2)
PROT SERPL-MCNC: 6.5 G/DL (ref 6–8.5)
RBC # BLD AUTO: 5.09 10*6/MM3 (ref 4.14–5.8)
SODIUM SERPL-SCNC: 141 MMOL/L (ref 136–145)
TRIGL SERPL-MCNC: 115 MG/DL (ref 0–150)
TSH SERPL DL<=0.05 MIU/L-ACNC: 1.36 UIU/ML (ref 0.27–4.2)
VLDLC SERPL-MCNC: 21 MG/DL (ref 5–40)
WBC # BLD AUTO: 6.41 10*3/MM3 (ref 3.4–10.8)

## 2021-08-10 PROCEDURE — 99214 OFFICE O/P EST MOD 30 MIN: CPT | Performed by: NURSE PRACTITIONER

## 2021-08-10 PROCEDURE — 84443 ASSAY THYROID STIM HORMONE: CPT

## 2021-08-10 PROCEDURE — 84402 ASSAY OF FREE TESTOSTERONE: CPT

## 2021-08-10 PROCEDURE — 80053 COMPREHEN METABOLIC PANEL: CPT

## 2021-08-10 PROCEDURE — 90750 HZV VACC RECOMBINANT IM: CPT | Performed by: NURSE PRACTITIONER

## 2021-08-10 PROCEDURE — 80061 LIPID PANEL: CPT

## 2021-08-10 PROCEDURE — 84403 ASSAY OF TOTAL TESTOSTERONE: CPT

## 2021-08-10 PROCEDURE — 85025 COMPLETE CBC W/AUTO DIFF WBC: CPT

## 2021-08-10 RX ORDER — SILDENAFIL CITRATE 20 MG/1
TABLET ORAL
Qty: 30 TABLET | Refills: 5 | Status: SHIPPED | OUTPATIENT
Start: 2021-08-10 | End: 2022-08-03 | Stop reason: SDUPTHER

## 2021-08-10 RX ORDER — TRIAMCINOLONE ACETONIDE 40 MG/ML
80 INJECTION, SUSPENSION INTRA-ARTICULAR; INTRAMUSCULAR ONCE
Status: DISCONTINUED | OUTPATIENT
Start: 2021-08-10 | End: 2021-12-22

## 2021-08-10 NOTE — PROGRESS NOTES
"Chief Complaint  Hypertension (6 month check up), Anxiety, Insomnia, and Back Pain    Subjective  Over the last several months has been complaining about cough dysfunction.  \"I seem to have trouble getting a full erection.\"        James Fabian presents to [facility]  HPI:  Annual physical exam     Hypertension  This is a chronic problem. The current episode started more than 1 year ago. The problem is unchanged. The problem is controlled. Associated symptoms include anxiety. Pertinent negatives include no peripheral edema or shortness of breath. There are no associated agents to hypertension. Risk factors for coronary artery disease include diabetes mellitus, male gender and obesity. Past treatments include ACE inhibitors and calcium channel blockers. Current antihypertension treatment includes calcium channel blockers and ACE inhibitors. The current treatment provides moderate improvement. Compliance problems include diet and exercise.    Anxiety  Presents for follow-up visit. Symptoms include insomnia. Patient reports no confusion or shortness of breath. Symptoms occur occasionally. The severity of symptoms is moderate. The quality of sleep is good. Nighttime awakenings: none.     Compliance with medications is %.   Insomnia  This is a chronic problem. The current episode started more than 1 month ago. The problem occurs constantly. The problem has been unchanged. Pertinent negatives include no chills, coughing, diaphoresis, fatigue, fever or sore throat. Nothing aggravates the symptoms. Treatments tried: Trazodone. The treatment provided mild relief.   Back Pain  This is a chronic problem. The current episode started more than 1 year ago. The problem occurs constantly. The problem is unchanged. The pain is present in the lumbar spine. The pain radiates to the right thigh. The pain is at a severity of 8/10. The pain is moderate. The symptoms are aggravated by position. " "Pertinent negatives include no fever. He has tried heat, home exercises, NSAIDs and muscle relaxant for the symptoms. The treatment provided moderate relief.   Erectile Dysfunction  This is a chronic problem. The current episode started more than 1 month ago. The problem has been gradually worsening since onset. The nature of his difficulty is achieving erection. He reports no decreased libido. Irritative symptoms do not include nocturia or urgency. Obstructive symptoms do not include a weak stream. Pertinent negatives include no chills. Nothing aggravates the symptoms. Past treatments include nothing. The treatment provided no relief.       Objective   Vital Signs:   /66   Pulse 82   Ht 177.8 cm (70\")   Wt 121 kg (267 lb 6.4 oz)   SpO2 95%   BMI 38.37 kg/m²     Physical Exam  Vitals and nursing note reviewed.   Constitutional:       Appearance: He is well-developed.   HENT:      Head: Normocephalic.   Eyes:      Pupils: Pupils are equal, round, and reactive to light.   Cardiovascular:      Rate and Rhythm: Normal rate and regular rhythm.      Heart sounds: Normal heart sounds.   Pulmonary:      Effort: Pulmonary effort is normal.      Breath sounds: Normal breath sounds.   Abdominal:      General: Bowel sounds are normal.      Palpations: Abdomen is soft.   Musculoskeletal:         General: Normal range of motion.      Cervical back: Normal range of motion and neck supple.   Skin:     General: Skin is warm.      Capillary Refill: Capillary refill takes less than 2 seconds.   Neurological:      Mental Status: He is alert and oriented to person, place, and time.   Psychiatric:         Behavior: Behavior normal.        Result Review :                   Assessment and Plan    Diagnoses and all orders for this visit:    1. Essential hypertension (Primary)    2. Anxiety    3. Primary insomnia    4. Chronic right-sided low back pain with right-sided sciatica  -     triamcinolone acetonide (KENALOG-40) injection " 80 mg    5. Erectile dysfunction, unspecified erectile dysfunction type  -     Testosterone, Free, Total; Future  -     sildenafil (REVATIO) 20 MG tablet; Use 1-5 tablets as needed for erectile dysfunction. Do not exceed 100mg in 24 hours.  Dispense: 30 tablet; Refill: 5    6. Need for shingles vaccine  -     Shingrix Vaccine      1.  Essential hypertension:  Continue lisinopril as previously prescribed continue Norvasc as previously prescribed  Continue on reduce sodium intake of no more than 2000 mg/day  Discontinue use of table salt    2.  Anxiety:  Continue on Effexor as previously prescribed  Continue with stress reducing techniques previously discussed    3.  Primary insomnia:  Continue on trazodone as previously prescribed  May use chamomile or lavender tea prior to bedtime as this has been shown to promote relaxation  Set regular bedtime nightly in order to help set a sleep pattern    4.  Chronic right-sided low back pain with right-sided sciatica:  Kenalog 80 mg given IM in office  Educated on possible side effects of long-term use of steroid medications including not limited to increased risk for glaucoma and vitamin B12 deficiency  Continue Flexeril as previously prescribed    5.  Erectile dysfunction, unspecified erectile dysfunction type: Complete free and total testosterone as ordered and will notify of results when available  Begin sildenafil as prescribed  Educated on possible side effects of this medication including not limited to increased risk for priapism and unsafe drop in blood pressure  Thoroughly educated not to use medication in combination with nitrates  Encouraged to drink plenty of water while on this medication  Encouraged to seek emergency medical treatment for any erection lasting longer than 4 hours    6.  Need for shingles vaccine:  Shingrix vaccine given IM in office  Educated on possible side effects of this medication including not limited to increased risk of pain, redness and  swelling of injection site  Educated on importance of completing second dose of vaccine therapy no sooner than 2 months but no greater than 6 months    I spent 32 minutes caring for James on this date of service. This time includes time spent by me in the following activities:preparing for the visit, reviewing tests, obtaining and/or reviewing a separately obtained history, performing a medically appropriate examination and/or evaluation , counseling and educating the patient/family/caregiver, ordering medications, tests, or procedures and documenting information in the medical record  Follow Up   Return in about 6 months (around 2/10/2022) for Recheck.  Patient was given instructions and counseling regarding his condition or for health maintenance advice. Please see specific information pulled into the AVS if appropriate.         This document has been electronically signed by ISABEL Reynolds on August 10, 2021 16:21 CDT

## 2021-08-12 ENCOUNTER — TELEPHONE (OUTPATIENT)
Dept: FAMILY MEDICINE CLINIC | Facility: CLINIC | Age: 61
End: 2021-08-12

## 2021-08-12 NOTE — PROGRESS NOTES
Per ISABEL Cavanaugh, Mr. Fabian has been called with recent lab results & recommendations.  Continue current medications and follow-up as planned or sooner if any problems.

## 2021-08-12 NOTE — TELEPHONE ENCOUNTER
Per ISABEL Cavanaugh, Mr. Fabian has been called with recent lab results & recommendations.  Continue current medications and follow-up as planned or sooner if any problems.       ----- Message from ISABEL Reynolds sent at 8/11/2021  6:54 AM CDT -----  Labs normal, please call and let him know I'm still waiting on the results of his testosterone levels.  I will notify him once they are available.

## 2021-08-14 LAB
TESTOST FREE SERPL-MCNC: 4.9 PG/ML (ref 6.6–18.1)
TESTOST SERPL-MCNC: 402 NG/DL (ref 264–916)

## 2021-08-23 ENCOUNTER — TELEPHONE (OUTPATIENT)
Dept: FAMILY MEDICINE CLINIC | Facility: CLINIC | Age: 61
End: 2021-08-23

## 2021-08-23 NOTE — TELEPHONE ENCOUNTER
Per ISABEL Cavanaugh, Mr. Fabian has been called with recent lab results & recommendations.  Continue current medications and follow-up as planned or sooner if any problems.     He will consider replacement therapy and will call and schedule an appt when his schedule opens up.        ----- Message from ISABEL Reynolds sent at 8/15/2021  1:53 PM CDT -----  Total testosterone level was within normal limits but free testosterone was low.  If he would like to discuss testosterone replacement therapy he needs to schedule an appointment.  This is a controlled substance and will require a face-to-face visit.

## 2021-08-23 NOTE — PROGRESS NOTES
Per ISABEL Cavanaugh, Mr. Fabian has been called with recent lab results & recommendations.  Continue current medications and follow-up as planned or sooner if any problems.     He will consider replacement therapy and will call and schedule an appt when his schedule opens up.

## 2021-08-24 ENCOUNTER — TELEPHONE (OUTPATIENT)
Dept: FAMILY MEDICINE CLINIC | Facility: CLINIC | Age: 61
End: 2021-08-24

## 2021-08-24 RX ORDER — FLUTICASONE PROPIONATE 50 MCG
2 SPRAY, SUSPENSION (ML) NASAL DAILY
Qty: 16 G | Refills: 2 | Status: SHIPPED | OUTPATIENT
Start: 2021-08-24 | End: 2021-11-29 | Stop reason: SDUPTHER

## 2021-11-29 RX ORDER — FLUTICASONE PROPIONATE 50 MCG
2 SPRAY, SUSPENSION (ML) NASAL DAILY
Qty: 16 G | Refills: 2 | Status: SHIPPED | OUTPATIENT
Start: 2021-11-29

## 2021-12-22 ENCOUNTER — OFFICE VISIT (OUTPATIENT)
Dept: FAMILY MEDICINE CLINIC | Facility: CLINIC | Age: 61
End: 2021-12-22

## 2021-12-22 VITALS
BODY MASS INDEX: 40.53 KG/M2 | HEIGHT: 70 IN | OXYGEN SATURATION: 98 % | DIASTOLIC BLOOD PRESSURE: 70 MMHG | WEIGHT: 283.1 LBS | HEART RATE: 88 BPM | SYSTOLIC BLOOD PRESSURE: 136 MMHG

## 2021-12-22 DIAGNOSIS — Z23 NEED FOR IMMUNIZATION AGAINST INFLUENZA: ICD-10-CM

## 2021-12-22 DIAGNOSIS — G89.29 CHRONIC RIGHT-SIDED LOW BACK PAIN WITH RIGHT-SIDED SCIATICA: ICD-10-CM

## 2021-12-22 DIAGNOSIS — Z23 NEED FOR SHINGLES VACCINE: ICD-10-CM

## 2021-12-22 DIAGNOSIS — F51.01 PRIMARY INSOMNIA: ICD-10-CM

## 2021-12-22 DIAGNOSIS — G47.33 OSA (OBSTRUCTIVE SLEEP APNEA): ICD-10-CM

## 2021-12-22 DIAGNOSIS — I10 ESSENTIAL HYPERTENSION: Primary | ICD-10-CM

## 2021-12-22 DIAGNOSIS — M54.41 CHRONIC RIGHT-SIDED LOW BACK PAIN WITH RIGHT-SIDED SCIATICA: ICD-10-CM

## 2021-12-22 DIAGNOSIS — F41.9 ANXIETY: ICD-10-CM

## 2021-12-22 PROCEDURE — 90686 IIV4 VACC NO PRSV 0.5 ML IM: CPT | Performed by: NURSE PRACTITIONER

## 2021-12-22 PROCEDURE — 90750 HZV VACC RECOMBINANT IM: CPT | Performed by: NURSE PRACTITIONER

## 2021-12-22 PROCEDURE — 90471 IMMUNIZATION ADMIN: CPT | Performed by: NURSE PRACTITIONER

## 2021-12-22 PROCEDURE — 90472 IMMUNIZATION ADMIN EACH ADD: CPT | Performed by: NURSE PRACTITIONER

## 2021-12-22 PROCEDURE — 99213 OFFICE O/P EST LOW 20 MIN: CPT | Performed by: NURSE PRACTITIONER

## 2021-12-22 NOTE — PROGRESS NOTES
Chief Complaint  Hypertension (6 month check up), Anxiety, Insomnia, and Back Pain    Subjective   Six month follow-up for HTN, anxiety, insomnia, back pain.  His younger brother passed away from COVID this past September and his mother is being tested for pancreatic cancer.         James Fabian presents to [facility] [place]  Hypertension  This is a chronic problem. The current episode started more than 1 year ago. The problem is unchanged. The problem is controlled. Associated symptoms include anxiety. Pertinent negatives include no peripheral edema or shortness of breath. There are no associated agents to hypertension. Risk factors for coronary artery disease include diabetes mellitus, male gender and obesity. Past treatments include ACE inhibitors and calcium channel blockers. Current antihypertension treatment includes calcium channel blockers and ACE inhibitors. The current treatment provides moderate improvement. Compliance problems include diet and exercise.    Anxiety  Presents for follow-up visit. Symptoms include insomnia. Patient reports no confusion or shortness of breath. Symptoms occur occasionally. The severity of symptoms is moderate. The quality of sleep is good. Nighttime awakenings: none.     Compliance with medications is %.   Insomnia  This is a chronic problem. The current episode started more than 1 month ago. The problem occurs constantly. The problem has been unchanged. Pertinent negatives include no coughing, diaphoresis, fatigue, fever or sore throat. Nothing aggravates the symptoms. Treatments tried: Trazodone. The treatment provided mild relief.   Back Pain  This is a chronic problem. The current episode started more than 1 year ago. The problem occurs constantly. The problem is unchanged. The pain is present in the lumbar spine. The pain radiates to the right thigh. The pain is at a severity of 8/10. The pain is moderate. The  "symptoms are aggravated by position. Pertinent negatives include no fever. He has tried heat, home exercises, NSAIDs and muscle relaxant for the symptoms. The treatment provided moderate relief.       Objective   Vital Signs:   /70   Pulse 88   Ht 177.8 cm (70\")   Wt 128 kg (283 lb 1.6 oz)   SpO2 98%   BMI 40.62 kg/m²     Physical Exam  Vitals and nursing note reviewed.   Constitutional:       Appearance: Normal appearance. He is well-developed. He is morbidly obese.   HENT:      Head: Normocephalic.      Right Ear: External ear normal.      Left Ear: External ear normal.   Eyes:      Pupils: Pupils are equal, round, and reactive to light.   Cardiovascular:      Rate and Rhythm: Normal rate and regular rhythm.      Heart sounds: Normal heart sounds.   Pulmonary:      Effort: Pulmonary effort is normal.      Breath sounds: Normal breath sounds.   Abdominal:      General: Bowel sounds are normal.      Palpations: Abdomen is soft.   Musculoskeletal:         General: Normal range of motion.      Cervical back: Normal range of motion and neck supple.   Skin:     General: Skin is warm.      Capillary Refill: Capillary refill takes less than 2 seconds.   Neurological:      Mental Status: He is alert and oriented to person, place, and time.   Psychiatric:         Behavior: Behavior normal.        Result Review :     Common labs    Common Labsle 8/10/21 8/10/21 8/10/21    1100 1100 1100   Glucose   98   BUN   14   Creatinine   1.10   eGFR Non African Am   68   Sodium   141   Potassium   4.0   Chloride   103   Calcium   8.6   Albumin   3.90   Total Bilirubin   0.3   Alkaline Phosphatase   45   AST (SGOT)   16   ALT (SGPT)   22   WBC 6.41     Hemoglobin 14.3     Hematocrit 43.8     Platelets 221     Total Cholesterol  170    Triglycerides  115    HDL Cholesterol  53    LDL Cholesterol   96                      Assessment and Plan    Diagnoses and all orders for this visit:    1. Essential hypertension " (Primary)    2. Anxiety    3. Primary insomnia    4. Chronic right-sided low back pain with right-sided sciatica    5. MADYSON (obstructive sleep apnea)  -     Ambulatory Referral to Sleep Medicine    6. Need for immunization against influenza  -     FluLaval/Fluarix/Fluzone >6 Months ([id number])    7. Need for shingles vaccine  -     Shingrix Vaccine    1.  Essential hypertension:  Continue amlodipine as previously prescribed  Continue on lisinopril as previously prescribed  Reduce sodium intake to no more than 1500 mg/day    2.  Anxiety:  Continue on Effexor as previously prescribed  Continue with stress reducing techniques such as meditation and guided imagery  Discussed at length how recent family stressors can increase anxiety    3.  Primary insomnia:  Continue on trazodone as previously prescribed    4.  Chronic right-sided low back pain with right-sided sciatica:  Continue on Flexeril as previously prescribed  Continue meloxicam as previously prescribed    5.  MADYSON:  Referral placed to sleep medicine and will contact patient to schedule appointment  Continue use of CPAP as previously prescribed    6.  Need for immunization against influenza:  Influenza vaccine given IM in office  Educated on possible side effects of this medication including but not limited to pain, swelling and redness of injection site as well as flulike symptoms    7.  Need for shingles vaccine:  Shingrix vaccine given IM in office  Educated on possible side effects of this medication including not limited to increased risk for injection site reaction  This completes second dose of vaccine therapy, no further shingles vaccines required at this time    I spent 24 minutes caring for James on this date of service. This time includes time spent by me in the following activities:preparing for the visit, reviewing tests, obtaining and/or reviewing a separately obtained history, performing a medically appropriate examination and/or evaluation ,  counseling and educating the patient/family/caregiver, ordering medications, tests, or procedures and documenting information in the medical record  Follow Up   Return in about 6 months (around 6/22/2022) for Annual physical.  Patient was given instructions and counseling regarding his condition or for health maintenance advice. Please see specific information pulled into the AVS if appropriate.         This document has been electronically signed by ISABEL Reynolds on December 22, 2021 11:40 CST

## 2022-01-12 PROCEDURE — 87635 SARS-COV-2 COVID-19 AMP PRB: CPT | Performed by: NURSE PRACTITIONER

## 2022-03-02 DIAGNOSIS — F51.01 PRIMARY INSOMNIA: ICD-10-CM

## 2022-03-02 RX ORDER — TRAZODONE HYDROCHLORIDE 150 MG/1
150 TABLET ORAL NIGHTLY
Qty: 90 TABLET | Refills: 3 | Status: SHIPPED | OUTPATIENT
Start: 2022-03-02 | End: 2022-08-03 | Stop reason: SDUPTHER

## 2022-03-02 NOTE — TELEPHONE ENCOUNTER
Incoming Refill Request      Medication requested (name and dose): TRAZODONE 150 MG     Pharmacy where request should be sent: [facility]     Additional details provided by patient:     Best call back number: [phone]    Does the patient have less than a 3 day supply:  [x] Yes  [] No    Luke Hendricks Rep  03/02/22, 11:13 CST

## 2022-08-03 ENCOUNTER — OFFICE VISIT (OUTPATIENT)
Dept: FAMILY MEDICINE CLINIC | Facility: CLINIC | Age: 62
End: 2022-08-03

## 2022-08-03 VITALS
WEIGHT: 274 LBS | OXYGEN SATURATION: 98 % | HEIGHT: 70 IN | SYSTOLIC BLOOD PRESSURE: 142 MMHG | DIASTOLIC BLOOD PRESSURE: 84 MMHG | HEART RATE: 83 BPM | BODY MASS INDEX: 39.22 KG/M2

## 2022-08-03 DIAGNOSIS — M54.41 CHRONIC RIGHT-SIDED LOW BACK PAIN WITH RIGHT-SIDED SCIATICA: ICD-10-CM

## 2022-08-03 DIAGNOSIS — I10 ESSENTIAL HYPERTENSION: Primary | ICD-10-CM

## 2022-08-03 DIAGNOSIS — Z13.220 SCREENING FOR HYPERCHOLESTEROLEMIA: ICD-10-CM

## 2022-08-03 DIAGNOSIS — G89.29 CHRONIC RIGHT-SIDED LOW BACK PAIN WITH RIGHT-SIDED SCIATICA: ICD-10-CM

## 2022-08-03 DIAGNOSIS — F41.9 ANXIETY: ICD-10-CM

## 2022-08-03 DIAGNOSIS — Z13.29 SCREENING FOR HYPOTHYROIDISM: ICD-10-CM

## 2022-08-03 DIAGNOSIS — F51.01 PRIMARY INSOMNIA: ICD-10-CM

## 2022-08-03 DIAGNOSIS — Z12.11 SCREENING FOR COLON CANCER: ICD-10-CM

## 2022-08-03 DIAGNOSIS — N52.9 ERECTILE DYSFUNCTION, UNSPECIFIED ERECTILE DYSFUNCTION TYPE: ICD-10-CM

## 2022-08-03 PROCEDURE — 99214 OFFICE O/P EST MOD 30 MIN: CPT | Performed by: NURSE PRACTITIONER

## 2022-08-03 PROCEDURE — 96372 THER/PROPH/DIAG INJ SC/IM: CPT | Performed by: NURSE PRACTITIONER

## 2022-08-03 RX ORDER — TRAZODONE HYDROCHLORIDE 150 MG/1
150 TABLET ORAL NIGHTLY
Qty: 90 TABLET | Refills: 3 | Status: SHIPPED | OUTPATIENT
Start: 2022-08-03

## 2022-08-03 RX ORDER — KETOROLAC TROMETHAMINE 30 MG/ML
60 INJECTION, SOLUTION INTRAMUSCULAR; INTRAVENOUS ONCE
Status: COMPLETED | OUTPATIENT
Start: 2022-08-03 | End: 2022-08-03

## 2022-08-03 RX ORDER — MELOXICAM 15 MG/1
15 TABLET ORAL DAILY
Qty: 90 TABLET | Refills: 2 | Status: SHIPPED | OUTPATIENT
Start: 2022-08-03

## 2022-08-03 RX ORDER — CYCLOBENZAPRINE HCL 10 MG
10 TABLET ORAL 3 TIMES DAILY PRN
Qty: 180 TABLET | Refills: 2 | Status: SHIPPED | OUTPATIENT
Start: 2022-08-03

## 2022-08-03 RX ORDER — TRIAMCINOLONE ACETONIDE 40 MG/ML
80 INJECTION, SUSPENSION INTRA-ARTICULAR; INTRAMUSCULAR ONCE
Status: COMPLETED | OUTPATIENT
Start: 2022-08-03 | End: 2022-08-03

## 2022-08-03 RX ORDER — SILDENAFIL CITRATE 20 MG/1
TABLET ORAL
Qty: 30 TABLET | Refills: 5 | Status: SHIPPED | OUTPATIENT
Start: 2022-08-03

## 2022-08-03 RX ORDER — AMLODIPINE BESYLATE 5 MG/1
5 TABLET ORAL DAILY
Qty: 90 TABLET | Refills: 2 | Status: SHIPPED | OUTPATIENT
Start: 2022-08-03

## 2022-08-03 RX ORDER — VENLAFAXINE HYDROCHLORIDE 150 MG/1
150 CAPSULE, EXTENDED RELEASE ORAL DAILY
Qty: 90 CAPSULE | Refills: 2 | Status: SHIPPED | OUTPATIENT
Start: 2022-08-03

## 2022-08-03 RX ORDER — LISINOPRIL 40 MG/1
80 TABLET ORAL DAILY
Qty: 180 TABLET | Refills: 2 | Status: SHIPPED | OUTPATIENT
Start: 2022-08-03

## 2022-08-03 RX ADMIN — TRIAMCINOLONE ACETONIDE 80 MG: 40 INJECTION, SUSPENSION INTRA-ARTICULAR; INTRAMUSCULAR at 11:40

## 2022-08-03 RX ADMIN — KETOROLAC TROMETHAMINE 60 MG: 30 INJECTION, SOLUTION INTRAMUSCULAR; INTRAVENOUS at 11:40

## 2022-08-03 NOTE — PROGRESS NOTES
Chief Complaint  Hypertension, Med Refill, Anxiety, Insomnia, and Back Pain    Subjective  6-month follow-up for high blood pressure, anxiety, insomnia and chronic back pain         James Fabian presents to [facility] [place]  Hypertension  This is a chronic problem. The current episode started more than 1 year ago. The problem is unchanged. The problem is controlled. Associated symptoms include anxiety. Pertinent negatives include no peripheral edema or shortness of breath. There are no associated agents to hypertension. Risk factors for coronary artery disease include diabetes mellitus, male gender and obesity. Past treatments include ACE inhibitors and calcium channel blockers. Current antihypertension treatment includes calcium channel blockers and ACE inhibitors. The current treatment provides moderate improvement. Compliance problems include diet and exercise.    Anxiety  Presents for follow-up visit. Symptoms include insomnia. Patient reports no confusion or shortness of breath. Symptoms occur occasionally. The severity of symptoms is moderate. The quality of sleep is good. Nighttime awakenings: none.     Compliance with medications is %.   Insomnia  This is a chronic problem. The current episode started more than 1 month ago. The problem occurs constantly. The problem has been unchanged. Pertinent negatives include no coughing, diaphoresis, fatigue, fever or sore throat. Nothing aggravates the symptoms. Treatments tried: Trazodone. The treatment provided mild relief.   Back Pain  This is a chronic problem. The current episode started more than 1 year ago. The problem occurs constantly. The problem is unchanged. The pain is present in the lumbar spine. The pain radiates to the right thigh. The pain is at a severity of 8/10. The pain is moderate. The symptoms are aggravated by position. Pertinent negatives include no fever. He has tried heat, home  "exercises, NSAIDs and muscle relaxant for the symptoms. The treatment provided moderate relief.     Current Outpatient Medications on File Prior to Visit   Medication Sig Dispense Refill   • fluticasone (Flonase) 50 MCG/ACT nasal spray 2 sprays into the nostril(s) as directed by provider Daily. 16 g 2   • [DISCONTINUED] amLODIPine (NORVASC) 5 MG tablet Take 1 tablet by mouth Daily. 90 tablet 2   • [DISCONTINUED] cyclobenzaprine (FLEXERIL) 10 MG tablet TAKE 1 TABLET BY MOUTH THREE TIMES DAILY AS NEEDED FOR MUSCLE SPASMS 90 tablet 2   • [DISCONTINUED] lisinopril (PRINIVIL,ZESTRIL) 40 MG tablet Take 2 tablets by mouth Daily. 180 tablet 2   • [DISCONTINUED] meloxicam (MOBIC) 15 MG tablet Take 1 tablet by mouth Daily. 90 tablet 2   • [DISCONTINUED] benzonatate (TESSALON) 200 MG capsule Take 1 capsule by mouth 3 (Three) Times a Day As Needed for Cough. 30 capsule 0   • [DISCONTINUED] promethazine-dextromethorphan (PROMETHAZINE-DM) 6.25-15 MG/5ML syrup Take 5 mL by mouth At Night As Needed for Cough. 120 mL 0   • [DISCONTINUED] sildenafil (REVATIO) 20 MG tablet Use 1-5 tablets as needed for erectile dysfunction. Do not exceed 100mg in 24 hours. 30 tablet 5   • [DISCONTINUED] traZODone (DESYREL) 150 MG tablet Take 1 tablet by mouth Every Night. 90 tablet 3   • [DISCONTINUED] venlafaxine XR (EFFEXOR-XR) 150 MG 24 hr capsule Take 1 capsule by mouth Daily. 90 capsule 2     No current facility-administered medications on file prior to visit.     No Known Allergies    Objective   Vital Signs:  /84   Pulse 83   Ht 177.8 cm (70\")   Wt 124 kg (274 lb)   SpO2 98%   BMI 39.31 kg/m²   Estimated body mass index is 39.31 kg/m² as calculated from the following:    Height as of this encounter: 177.8 cm (70\").    Weight as of this encounter: 124 kg (274 lb).      Physical Exam  Vitals and nursing note reviewed.   Constitutional:       Appearance: Normal appearance. He is well-developed. He is obese.   HENT:      Head: " Normocephalic.      Right Ear: External ear normal.      Left Ear: External ear normal.   Eyes:      Pupils: Pupils are equal, round, and reactive to light.   Cardiovascular:      Rate and Rhythm: Normal rate and regular rhythm.      Heart sounds: Normal heart sounds.   Pulmonary:      Effort: Pulmonary effort is normal.      Breath sounds: Normal breath sounds.   Abdominal:      General: Bowel sounds are normal.      Palpations: Abdomen is soft.   Musculoskeletal:         General: Normal range of motion.      Cervical back: Normal range of motion and neck supple.   Skin:     General: Skin is warm.      Capillary Refill: Capillary refill takes less than 2 seconds.   Neurological:      Mental Status: He is alert and oriented to person, place, and time.   Psychiatric:         Behavior: Behavior normal.        Result Review :  The following data was reviewed by: ISABEL Reynolds on 08/03/2022:  Common labs    Common Labsle 8/10/21 8/10/21 8/10/21    1100 1100 1100   Glucose   98   BUN   14   Creatinine   1.10   eGFR Non African Am   68   Sodium   141   Potassium   4.0   Chloride   103   Calcium   8.6   Albumin   3.90   Total Bilirubin   0.3   Alkaline Phosphatase   45   AST (SGOT)   16   ALT (SGPT)   22   WBC 6.41     Hemoglobin 14.3     Hematocrit 43.8     Platelets 221     Total Cholesterol  170    Triglycerides  115    HDL Cholesterol  53    LDL Cholesterol   96                      Assessment and Plan   Diagnoses and all orders for this visit:    1. Essential hypertension (Primary)  -     CBC & Differential; Future  -     Comprehensive Metabolic Panel; Future  -     amLODIPine (NORVASC) 5 MG tablet; Take 1 tablet by mouth Daily.  Dispense: 90 tablet; Refill: 2  -     lisinopril (PRINIVIL,ZESTRIL) 40 MG tablet; Take 2 tablets by mouth Daily.  Dispense: 180 tablet; Refill: 2    2. Anxiety  -     venlafaxine XR (EFFEXOR-XR) 150 MG 24 hr capsule; Take 1 capsule by mouth Daily.  Dispense: 90 capsule; Refill: 2    3.  Primary insomnia  -     traZODone (DESYREL) 150 MG tablet; Take 1 tablet by mouth Every Night.  Dispense: 90 tablet; Refill: 3    4. Chronic right-sided low back pain with right-sided sciatica  -     triamcinolone acetonide (KENALOG-40) injection 80 mg  -     cyclobenzaprine (FLEXERIL) 10 MG tablet; Take 1 tablet by mouth 3 (Three) Times a Day As Needed for Muscle Spasms. for muscle spams  Dispense: 180 tablet; Refill: 2  -     meloxicam (MOBIC) 15 MG tablet; Take 1 tablet by mouth Daily.  Dispense: 90 tablet; Refill: 2  -     ketorolac (TORADOL) injection 60 mg    5. Erectile dysfunction, unspecified erectile dysfunction type  -     sildenafil (REVATIO) 20 MG tablet; Use 1-5 tablets as needed for erectile dysfunction. Do not exceed 100mg in 24 hours.  Dispense: 30 tablet; Refill: 5    6. Screening for hypercholesterolemia  -     Lipid Panel; Future    7. Screening for hypothyroidism  -     TSH; Future    8. Screening for colon cancer  -     Ambulatory Referral For Screening Colonoscopy    1.  Essential hypertension:  Continue amlodipine and lisinopril as previously prescribed  Continue low-sodium diet    2.  Anxiety:  Continue on Effexor as previously prescribed  Continue with stress reducing techniques such as meditation and guided imagery  Stress levels have been increased due to recent guardianship of his 3-year-old granddaughter     3.  Primary insomnia:  Continue on trazodone as previously prescribed    4.  Chronic right-sided low back pain with right-sided sciatica:  Kenalog 80 mg given IM in office  Toradol 60 mg given IM in office  Educated on importance of avoiding use of NSAIDs for 24 hours postinjection  Continue on Flexeril as previously prescribed  Continue meloxicam as previously prescribed    5.  Erectile dysfunction, unspecified erectile dysfunction type:  Continue sildenafil and refill prescription sent to pharmacy  Reeducated on importance of seeking emergency medical treatment for erection  lasting longer than 4 hours     6.  Screening for hypercholesterolemia:  Complete fasting lipid panel as ordered will notify of results when available    7.  Screening for hypothyroidism:  Complete TSH as ordered and will notify of results when available    8.  Screening for colon cancer:  Referral for screening colonoscopy placed and will contact patient to schedule appointment         Follow Up   Return in about 3 months (around 11/3/2022) for Annual physical.  Patient was given instructions and counseling regarding his condition or for health maintenance advice. Please see specific information pulled into the AVS if appropriate.         This document has been electronically signed by ISABEL Reynolds on August 3, 2022 14:17 CDT

## 2022-08-25 PROCEDURE — 87635 SARS-COV-2 COVID-19 AMP PRB: CPT | Performed by: NURSE PRACTITIONER

## 2022-12-13 ENCOUNTER — LAB (OUTPATIENT)
Dept: LAB | Facility: HOSPITAL | Age: 62
End: 2022-12-13

## 2022-12-13 ENCOUNTER — OFFICE VISIT (OUTPATIENT)
Dept: FAMILY MEDICINE CLINIC | Facility: CLINIC | Age: 62
End: 2022-12-13

## 2022-12-13 VITALS
WEIGHT: 276.8 LBS | HEIGHT: 70 IN | SYSTOLIC BLOOD PRESSURE: 132 MMHG | HEART RATE: 76 BPM | BODY MASS INDEX: 39.63 KG/M2 | DIASTOLIC BLOOD PRESSURE: 78 MMHG | OXYGEN SATURATION: 95 %

## 2022-12-13 DIAGNOSIS — M54.41 CHRONIC RIGHT-SIDED LOW BACK PAIN WITH RIGHT-SIDED SCIATICA: ICD-10-CM

## 2022-12-13 DIAGNOSIS — Z12.5 SCREENING FOR PROSTATE CANCER: ICD-10-CM

## 2022-12-13 DIAGNOSIS — Z23 NEED FOR IMMUNIZATION AGAINST INFLUENZA: ICD-10-CM

## 2022-12-13 DIAGNOSIS — F51.01 PRIMARY INSOMNIA: ICD-10-CM

## 2022-12-13 DIAGNOSIS — Z12.11 SCREEN FOR COLON CANCER: ICD-10-CM

## 2022-12-13 DIAGNOSIS — Z13.29 SCREENING FOR HYPOTHYROIDISM: ICD-10-CM

## 2022-12-13 DIAGNOSIS — F41.9 ANXIETY: ICD-10-CM

## 2022-12-13 DIAGNOSIS — G89.29 CHRONIC RIGHT-SIDED LOW BACK PAIN WITH RIGHT-SIDED SCIATICA: ICD-10-CM

## 2022-12-13 DIAGNOSIS — I10 ESSENTIAL HYPERTENSION: ICD-10-CM

## 2022-12-13 DIAGNOSIS — R61 EXCESSIVE SWEATING: ICD-10-CM

## 2022-12-13 DIAGNOSIS — Z00.00 ANNUAL PHYSICAL EXAM: Primary | ICD-10-CM

## 2022-12-13 DIAGNOSIS — Z13.220 SCREENING FOR HYPERCHOLESTEROLEMIA: ICD-10-CM

## 2022-12-13 DIAGNOSIS — Z23 NEED FOR TDAP VACCINATION: ICD-10-CM

## 2022-12-13 LAB
ALBUMIN SERPL-MCNC: 4.2 G/DL (ref 3.5–5.2)
ALBUMIN/GLOB SERPL: 1.7 G/DL
ALP SERPL-CCNC: 47 U/L (ref 39–117)
ALT SERPL W P-5'-P-CCNC: 25 U/L (ref 1–41)
ANION GAP SERPL CALCULATED.3IONS-SCNC: 6.9 MMOL/L (ref 5–15)
AST SERPL-CCNC: 19 U/L (ref 1–40)
BASOPHILS # BLD AUTO: 0.06 10*3/MM3 (ref 0–0.2)
BASOPHILS NFR BLD AUTO: 0.9 % (ref 0–1.5)
BILIRUB SERPL-MCNC: 0.3 MG/DL (ref 0–1.2)
BUN SERPL-MCNC: 16 MG/DL (ref 8–23)
BUN/CREAT SERPL: 16 (ref 7–25)
CALCIUM SPEC-SCNC: 8.8 MG/DL (ref 8.6–10.5)
CHLORIDE SERPL-SCNC: 105 MMOL/L (ref 98–107)
CHOLEST SERPL-MCNC: 183 MG/DL (ref 0–200)
CO2 SERPL-SCNC: 25.1 MMOL/L (ref 22–29)
CREAT SERPL-MCNC: 1 MG/DL (ref 0.76–1.27)
DEPRECATED RDW RBC AUTO: 40.9 FL (ref 37–54)
EGFRCR SERPLBLD CKD-EPI 2021: 85.6 ML/MIN/1.73
EOSINOPHIL # BLD AUTO: 0.2 10*3/MM3 (ref 0–0.4)
EOSINOPHIL NFR BLD AUTO: 3 % (ref 0.3–6.2)
ERYTHROCYTE [DISTWIDTH] IN BLOOD BY AUTOMATED COUNT: 13.3 % (ref 12.3–15.4)
GLOBULIN UR ELPH-MCNC: 2.5 GM/DL
GLUCOSE SERPL-MCNC: 101 MG/DL (ref 65–99)
HCT VFR BLD AUTO: 43.1 % (ref 37.5–51)
HDLC SERPL-MCNC: 52 MG/DL (ref 40–60)
HGB BLD-MCNC: 14.7 G/DL (ref 13–17.7)
IMM GRANULOCYTES # BLD AUTO: 0.02 10*3/MM3 (ref 0–0.05)
IMM GRANULOCYTES NFR BLD AUTO: 0.3 % (ref 0–0.5)
LDLC SERPL CALC-MCNC: 118 MG/DL (ref 0–100)
LDLC/HDLC SERPL: 2.26 {RATIO}
LYMPHOCYTES # BLD AUTO: 1.65 10*3/MM3 (ref 0.7–3.1)
LYMPHOCYTES NFR BLD AUTO: 24.8 % (ref 19.6–45.3)
MCH RBC QN AUTO: 28.8 PG (ref 26.6–33)
MCHC RBC AUTO-ENTMCNC: 34.1 G/DL (ref 31.5–35.7)
MCV RBC AUTO: 84.3 FL (ref 79–97)
MONOCYTES # BLD AUTO: 0.65 10*3/MM3 (ref 0.1–0.9)
MONOCYTES NFR BLD AUTO: 9.8 % (ref 5–12)
NEUTROPHILS NFR BLD AUTO: 4.07 10*3/MM3 (ref 1.7–7)
NEUTROPHILS NFR BLD AUTO: 61.2 % (ref 42.7–76)
NRBC BLD AUTO-RTO: 0 /100 WBC (ref 0–0.2)
PLATELET # BLD AUTO: 208 10*3/MM3 (ref 140–450)
PMV BLD AUTO: 9.5 FL (ref 6–12)
POTASSIUM SERPL-SCNC: 3.7 MMOL/L (ref 3.5–5.2)
PROT SERPL-MCNC: 6.7 G/DL (ref 6–8.5)
PSA SERPL-MCNC: 0.33 NG/ML (ref 0–4)
RBC # BLD AUTO: 5.11 10*6/MM3 (ref 4.14–5.8)
SODIUM SERPL-SCNC: 137 MMOL/L (ref 136–145)
TRIGL SERPL-MCNC: 67 MG/DL (ref 0–150)
TSH SERPL DL<=0.05 MIU/L-ACNC: 1.62 UIU/ML (ref 0.27–4.2)
VLDLC SERPL-MCNC: 13 MG/DL (ref 5–40)
WBC NRBC COR # BLD: 6.65 10*3/MM3 (ref 3.4–10.8)

## 2022-12-13 PROCEDURE — 90472 IMMUNIZATION ADMIN EACH ADD: CPT | Performed by: NURSE PRACTITIONER

## 2022-12-13 PROCEDURE — 90471 IMMUNIZATION ADMIN: CPT | Performed by: NURSE PRACTITIONER

## 2022-12-13 PROCEDURE — 80050 GENERAL HEALTH PANEL: CPT

## 2022-12-13 PROCEDURE — 90715 TDAP VACCINE 7 YRS/> IM: CPT | Performed by: NURSE PRACTITIONER

## 2022-12-13 PROCEDURE — 36415 COLL VENOUS BLD VENIPUNCTURE: CPT

## 2022-12-13 PROCEDURE — G0103 PSA SCREENING: HCPCS

## 2022-12-13 PROCEDURE — 80061 LIPID PANEL: CPT

## 2022-12-13 PROCEDURE — 99396 PREV VISIT EST AGE 40-64: CPT | Performed by: NURSE PRACTITIONER

## 2022-12-13 PROCEDURE — 90686 IIV4 VACC NO PRSV 0.5 ML IM: CPT | Performed by: NURSE PRACTITIONER

## 2022-12-13 NOTE — PROGRESS NOTES
"Chief Complaint  Annual Exam    Subjective   Annual physical exam.  Has high blood pressure, anxiety, insomnia and chronic back pain.  Would like to discuss possible treatments for excessive sweating.  \"For as long as I can remember I have always sweat.  It is to the point where I sweat so bad I do not want to do anything in the summer.  I can soak a hat in Less than 15 minutes and I can soak a shirt in less than 10.\"  Would like to discuss possible options for weight loss.  Has tried multiple diets in the past including low calorie and keto diet as well as increasing walking with no sustainable weight loss.  \"        James Fabian presents to [facility] - [place]  History of Present Illness  HPI:  Annual physical exam     HPI: Excessive sweating.  Chronicity is chronic.  Onset more than a year ago.  Frequency is constantly waxing and waning.  Associated symptoms none.  Aggravating factors minimal activity.  Treatments tried include prescription strength antiperspirant deodorants.  No relief with treatment  Hypertension  This is a chronic problem. The current episode started more than 1 year ago. The problem is unchanged. The problem is controlled. Associated symptoms include anxiety. Pertinent negatives include no peripheral edema or shortness of breath. There are no associated agents to hypertension. Risk factors for coronary artery disease include diabetes mellitus, male gender and obesity. Past treatments include ACE inhibitors and calcium channel blockers. Current antihypertension treatment includes calcium channel blockers and ACE inhibitors. The current treatment provides moderate improvement. Compliance problems include diet and exercise.    Anxiety  Presents for follow-up visit. Symptoms include insomnia. Patient reports no confusion or shortness of breath. Symptoms occur occasionally. The severity of symptoms is moderate. The quality of sleep is good. " Nighttime awakenings: none.     Compliance with medications is %.   Insomnia  This is a chronic problem. The current episode started more than 1 month ago. The problem occurs constantly. The problem has been unchanged. Nothing aggravates the symptoms. Treatments tried: Trazodone. The treatment provided mild relief.   Back Pain  This is a chronic problem. The current episode started more than 1 year ago. The problem occurs constantly. The problem is unchanged. The pain is present in the lumbar spine. The pain radiates to the right thigh. The pain is at a severity of 8/10. The pain is moderate. The symptoms are aggravated by position. He has tried heat, home exercises, NSAIDs and muscle relaxant for the symptoms. The treatment provided moderate relief.   Obesity  This is a chronic problem. The current episode started more than 1 year ago. The problem occurs constantly. The problem has been unchanged. The symptoms are aggravated by drinking and eating. Treatments tried: Calorie counting, keto diet, walking. The treatment provided no relief.     Current Outpatient Medications on File Prior to Visit   Medication Sig Dispense Refill   • amLODIPine (NORVASC) 5 MG tablet Take 1 tablet by mouth Daily. 90 tablet 2   • cyclobenzaprine (FLEXERIL) 10 MG tablet Take 1 tablet by mouth 3 (Three) Times a Day As Needed for Muscle Spasms. for muscle spams 180 tablet 2   • fluticasone (Flonase) 50 MCG/ACT nasal spray 2 sprays into the nostril(s) as directed by provider Daily. 16 g 2   • lisinopril (PRINIVIL,ZESTRIL) 40 MG tablet Take 2 tablets by mouth Daily. 180 tablet 2   • meloxicam (MOBIC) 15 MG tablet Take 1 tablet by mouth Daily. 90 tablet 2   • sildenafil (REVATIO) 20 MG tablet Use 1-5 tablets as needed for erectile dysfunction. Do not exceed 100mg in 24 hours. 30 tablet 5   • traZODone (DESYREL) 150 MG tablet Take 1 tablet by mouth Every Night. 90 tablet 3   • venlafaxine XR (EFFEXOR-XR) 150 MG 24 hr capsule Take 1  "capsule by mouth Daily. 90 capsule 2     No current facility-administered medications on file prior to visit.     No Known Allergies    Objective   Vital Signs:  /78   Pulse 76   Ht 177.8 cm (70\")   Wt 126 kg (276 lb 12.8 oz)   SpO2 95%   BMI 39.72 kg/m²   Estimated body mass index is 39.72 kg/m² as calculated from the following:    Height as of this encounter: 177.8 cm (70\").    Weight as of this encounter: 126 kg (276 lb 12.8 oz).        Physical Exam  Vitals and nursing note reviewed.   Constitutional:       Appearance: Normal appearance. He is well-developed. He is obese.   HENT:      Head: Normocephalic and atraumatic.      Right Ear: Tympanic membrane, ear canal and external ear normal.      Left Ear: Tympanic membrane, ear canal and external ear normal.      Nose: Nose normal.      Mouth/Throat:      Mouth: Mucous membranes are moist.      Pharynx: Oropharynx is clear.   Eyes:      Extraocular Movements: Extraocular movements intact.      Conjunctiva/sclera: Conjunctivae normal.      Pupils: Pupils are equal, round, and reactive to light.   Cardiovascular:      Rate and Rhythm: Normal rate and regular rhythm.      Pulses: Normal pulses.      Heart sounds: Normal heart sounds.   Pulmonary:      Effort: Pulmonary effort is normal.      Breath sounds: Normal breath sounds.   Abdominal:      General: Bowel sounds are normal.      Palpations: Abdomen is soft.   Musculoskeletal:         General: Normal range of motion.      Cervical back: Normal range of motion and neck supple.   Skin:     General: Skin is warm.      Capillary Refill: Capillary refill takes less than 2 seconds.   Neurological:      Mental Status: He is alert and oriented to person, place, and time.   Psychiatric:         Behavior: Behavior normal.        Result Review :  The following data was reviewed by: ISABEL Reynolds on 12/13/2022:  CMP    CMP 12/13/22   Glucose 101 (A)   BUN 16   Creatinine 1.00   Sodium 137   Potassium 3.7 "   Chloride 105   Calcium 8.8   Albumin 4.20   Total Bilirubin 0.3   Alkaline Phosphatase 47   AST (SGOT) 19   ALT (SGPT) 25   (A) Abnormal value            CBC    CBC 12/13/22   WBC 6.65   RBC 5.11   Hemoglobin 14.7   Hematocrit 43.1   MCV 84.3   MCH 28.8   MCHC 34.1   RDW 13.3   Platelets 208                     Assessment and Plan   Diagnoses and all orders for this visit:    1. Annual physical exam (Primary)    2. Essential hypertension    3. Primary insomnia    4. Anxiety    5. Chronic right-sided low back pain with right-sided sciatica    6. Excessive sweating  -     oxybutynin XL (Ditropan XL) 10 MG 24 hr tablet; Take 1 tablet by mouth Daily.  Dispense: 30 tablet; Refill: 2    7. Need for immunization against influenza  -     FluLaval/Fluzone >6 mos ([id number])    8. Need for Tdap vaccination  -     Tdap Vaccine Greater Than or Equal To 6yo IM    9. Screening for prostate cancer  -     PSA Screen; Future    10. Screen for colon cancer  -     Ambulatory Referral to General Surgery    1.  Annual physical exam:  Continue on current medications as previously prescribed   Counseling on importance of heathy eating habits and regular physical activity regimen on improving overall physical and mental health.     2.  Essential hypertension:  Normotensive  Complete CBC and chemistry panel as ordered and will notify of results when available  Continue amlodipine as previously prescribed  Continue lisinopril as as previously prescribed  Continue low-sodium diet  Target systolic BP to remain below 140    3.  Primary insomnia:  Continue trazodone as previously prescribed    4.  Anxiety:  Continue Effexor as previously prescribed    5.  Chronic right-sided low back pain with right-sided sciatica:  Continue meloxicam as previously prescribed  Continue Flexeril as previously  Encouraged range of motion exercises and home    6.   Excessive sweating:  Begin Ditropan as prescribed  Educated on possible side effects of this  medication including not limited to increased risk for dry mouth, dry eyes and urinary retention    7.  Need for immunization against influenza:  Influenza vaccine given IM in office  Educated on possible side effects of this medication including but not limited to pain, swelling and redness of injection site as well as flulike symptoms    8.  Need for Tdap vaccination:  Tdap vaccine given IM in office  Educated on possible side effects of this medication including but not limited to creased risk for pain, swelling and redness of injection site  Educated that vaccine therapy will be good for 10 years    9.  Screening for prostate cancer:  Complete PSA as ordered and will notify of results when available    10.  Screening for colon cancer:  Referral placed to Dr. Carroll, general surgeon, and will contact patient to schedule appointment for screening colonoscopy         Follow Up   Return in about 4 weeks (around 1/10/2023) for Recheck.  Patient was given instructions and counseling regarding his condition or for health maintenance advice. Please see specific information pulled into the AVS if appropriate.         This document has been electronically signed by ISABEL Reynolds on December 24, 2022 15:46 CST

## 2022-12-16 ENCOUNTER — TELEPHONE (OUTPATIENT)
Dept: FAMILY MEDICINE CLINIC | Facility: CLINIC | Age: 62
End: 2022-12-16

## 2022-12-16 RX ORDER — OXYBUTYNIN CHLORIDE 10 MG/1
10 TABLET, EXTENDED RELEASE ORAL DAILY
Qty: 30 TABLET | Refills: 2 | Status: SHIPPED | OUTPATIENT
Start: 2022-12-16 | End: 2023-03-21 | Stop reason: SDUPTHER

## 2022-12-16 NOTE — TELEPHONE ENCOUNTER
Per ISABEL Cavanaugh, Mr. Fabian  has been called with recent lab results & recommendations.  Continue current medications and follow-up as planned or sooner if any problems.       ----- Message from ISABEL Reynolds sent at 12/14/2022  4:37 PM CST -----  Slight elevation in bad cholesterol.  He needs to reduce saturated fats in his diet and increase exercise such as walking.  All other labs were essentially normal.

## 2023-01-10 ENCOUNTER — OFFICE VISIT (OUTPATIENT)
Dept: FAMILY MEDICINE CLINIC | Facility: CLINIC | Age: 63
End: 2023-01-10
Payer: COMMERCIAL

## 2023-01-10 VITALS
DIASTOLIC BLOOD PRESSURE: 74 MMHG | HEIGHT: 70 IN | BODY MASS INDEX: 40.44 KG/M2 | OXYGEN SATURATION: 95 % | HEART RATE: 82 BPM | SYSTOLIC BLOOD PRESSURE: 132 MMHG | WEIGHT: 282.5 LBS

## 2023-01-10 DIAGNOSIS — R94.31 ABNORMAL EKG: ICD-10-CM

## 2023-01-10 DIAGNOSIS — E66.01 MORBID (SEVERE) OBESITY DUE TO EXCESS CALORIES: ICD-10-CM

## 2023-01-10 DIAGNOSIS — Z79.899 HIGH RISK MEDICATION USE: ICD-10-CM

## 2023-01-10 DIAGNOSIS — R61 EXCESSIVE SWEATING: Primary | ICD-10-CM

## 2023-01-10 LAB
QT INTERVAL: 398 MS
QTC INTERVAL: 435 MS

## 2023-01-10 PROCEDURE — 93010 ELECTROCARDIOGRAM REPORT: CPT | Performed by: INTERNAL MEDICINE

## 2023-01-10 PROCEDURE — 99214 OFFICE O/P EST MOD 30 MIN: CPT | Performed by: NURSE PRACTITIONER

## 2023-01-10 PROCEDURE — 93005 ELECTROCARDIOGRAM TRACING: CPT | Performed by: NURSE PRACTITIONER

## 2023-01-10 NOTE — PROGRESS NOTES
Chief Complaint  Excessive Sweating and Obesity    Subjective   One month follow-up for excessive sweating.  Started on Ditropan at previous visit.  \"I can tell it has really helped.  I do have a dry mouth so I constantly keep sipping on something that has made it makes difference.  Now lets talk about something to help me with weight loss.\"  Has been trying dietary changes but has not had any sustainable weight loss.  Reports his mother just passed away from pancreatic cancer which would exclude him from any of the  GLP-1 class.        James Fabian presents to [facility] [place]  History of Present Illness  HPI:  Annual physical exam     HPI: Excessive sweating.  Chronicity is chronic.  Onset more than a year ago.  Frequency is occasionally.  Associated symptoms none.  Aggravating factors minimal activity.  Treatments tried include oxybutynin.  Relief with treatment is significant.  Obesity  This is a chronic problem. The current episode started more than 1 year ago. The problem occurs constantly. The problem has been unchanged. The symptoms are aggravated by drinking and eating. Treatments tried: Calorie counting, keto diet, walking. The treatment provided no relief.     Current Outpatient Medications on File Prior to Visit   Medication Sig Dispense Refill   • amLODIPine (NORVASC) 5 MG tablet Take 1 tablet by mouth Daily. 90 tablet 2   • cyclobenzaprine (FLEXERIL) 10 MG tablet Take 1 tablet by mouth 3 (Three) Times a Day As Needed for Muscle Spasms. for muscle spams 180 tablet 2   • fluticasone (Flonase) 50 MCG/ACT nasal spray 2 sprays into the nostril(s) as directed by provider Daily. 16 g 2   • lisinopril (PRINIVIL,ZESTRIL) 40 MG tablet Take 2 tablets by mouth Daily. 180 tablet 2   • meloxicam (MOBIC) 15 MG tablet Take 1 tablet by mouth Daily. 90 tablet 2   • oxybutynin XL (Ditropan XL) 10 MG 24 hr tablet Take 1 tablet by mouth Daily. 30 tablet 2   •  sildenafil (REVATIO) 20 MG tablet Use 1-5 tablets as needed for erectile dysfunction. Do not exceed 100mg in 24 hours. 30 tablet 5   • traZODone (DESYREL) 150 MG tablet Take 1 tablet by mouth Every Night. 90 tablet 3   • venlafaxine XR (EFFEXOR-XR) 150 MG 24 hr capsule Take 1 capsule by mouth Daily. 90 capsule 2     No current facility-administered medications on file prior to visit.     No Known Allergies    Objective   Vital Signs:  /74   Pulse 82   Ht 177.8 cm (70\")   Wt 128 kg (282 lb 8 oz)   SpO2 95%   BMI 40.53 kg/m²   Estimated body mass index is 40.53 kg/m² as calculated from the following:    Height as of this encounter: 177.8 cm (70\").    Weight as of this encounter: 128 kg (282 lb 8 oz).         Physical Exam  Vitals and nursing note reviewed.   Constitutional:       Appearance: Normal appearance. He is well-developed. He is obese.   Cardiovascular:      Rate and Rhythm: Normal rate and regular rhythm.      Heart sounds: Normal heart sounds.   Pulmonary:      Effort: Pulmonary effort is normal.      Breath sounds: Normal breath sounds.   Abdominal:      General: Bowel sounds are normal.      Palpations: Abdomen is soft.   Musculoskeletal:         General: Normal range of motion.   Skin:     General: Skin is warm.      Capillary Refill: Capillary refill takes less than 2 seconds.   Neurological:      Mental Status: He is alert and oriented to person, place, and time.   Psychiatric:         Behavior: Behavior normal.        Result Review :                   Assessment and Plan   Diagnoses and all orders for this visit:    1. Excessive sweating (Primary)    2. Morbid (severe) obesity due to excess calories (HCC)    3. High risk medication use  -     ECG 12 Lead    4. Abnormal EKG  -     Ambulatory Referral to Cardiology        1.  Excessive sweating:  Continue oxybutynin as previously prescribed  Encouraged hard candies to help loosen bowel    2.  Morbid obesity due to excess calories:  Body mass  index is 40.53 kg/m².  Class 3 Severe Obesity (BMI >=40). Obesity-related health conditions include the following: hypertension. Obesity is unchanged. BMI is is above average; BMI management plan is completed. We discussed portion control, increasing exercise and pharmacologic options including Phentermine .  We will hold prescription for phentermine at this time due to abnormal EKG consider cardiology evaluation  3.  High risk medication use:  Twelve-lead ECG performed in office   PDMP reviewed  Controlled substance contract signed and will be scanned into medical record    4.  Abnormal EKG:  Twelve-lead ECG performed in office and shows normal sinus rhythm with nonspecific interventricular conduction delay  Referral placed to cardiology for further evaluation of EKG abnormality prior to beginning phentermine  Encouraged to seek emergency medical treatment for any new or worsening chest pain, palpitations       Follow Up   Return in about 3 months (around 4/10/2023).  Patient was given instructions and counseling regarding his condition or for health maintenance advice. Please see specific information pulled into the AVS if appropriate.         This document has been electronically signed by ISABEL Reynolds on January 10, 2023 11:51 CST

## 2023-01-19 ENCOUNTER — TELEPHONE (OUTPATIENT)
Dept: FAMILY MEDICINE CLINIC | Facility: CLINIC | Age: 63
End: 2023-01-19
Payer: COMMERCIAL

## 2023-01-19 NOTE — TELEPHONE ENCOUNTER
Patient is wanting to see Dr. Burch  And is wanting to know if the referral could be changed. Please advise [phone]

## 2023-03-21 DIAGNOSIS — R61 EXCESSIVE SWEATING: ICD-10-CM

## 2023-03-21 RX ORDER — OXYBUTYNIN CHLORIDE 10 MG/1
10 TABLET, EXTENDED RELEASE ORAL DAILY
Qty: 30 TABLET | Refills: 2 | Status: SHIPPED | OUTPATIENT
Start: 2023-03-21

## 2023-04-27 DIAGNOSIS — I10 ESSENTIAL HYPERTENSION: ICD-10-CM

## 2023-04-27 DIAGNOSIS — F41.9 ANXIETY: ICD-10-CM

## 2023-04-27 RX ORDER — VENLAFAXINE HYDROCHLORIDE 150 MG/1
150 CAPSULE, EXTENDED RELEASE ORAL DAILY
Qty: 90 CAPSULE | Refills: 2 | Status: SHIPPED | OUTPATIENT
Start: 2023-04-27

## 2023-04-27 RX ORDER — LISINOPRIL 40 MG/1
80 TABLET ORAL DAILY
Qty: 180 TABLET | Refills: 2 | Status: SHIPPED | OUTPATIENT
Start: 2023-04-27

## 2023-07-20 ENCOUNTER — OFFICE VISIT (OUTPATIENT)
Dept: FAMILY MEDICINE CLINIC | Facility: CLINIC | Age: 63
End: 2023-07-20
Payer: COMMERCIAL

## 2023-07-20 VITALS
HEART RATE: 74 BPM | HEIGHT: 70 IN | BODY MASS INDEX: 40.87 KG/M2 | WEIGHT: 285.5 LBS | DIASTOLIC BLOOD PRESSURE: 78 MMHG | OXYGEN SATURATION: 95 % | SYSTOLIC BLOOD PRESSURE: 136 MMHG

## 2023-07-20 DIAGNOSIS — M54.41 CHRONIC RIGHT-SIDED LOW BACK PAIN WITH RIGHT-SIDED SCIATICA: ICD-10-CM

## 2023-07-20 DIAGNOSIS — R61 EXCESSIVE SWEATING: ICD-10-CM

## 2023-07-20 DIAGNOSIS — G89.29 CHRONIC RIGHT-SIDED LOW BACK PAIN WITH RIGHT-SIDED SCIATICA: ICD-10-CM

## 2023-07-20 DIAGNOSIS — Z13.220 SCREENING FOR HYPERCHOLESTEROLEMIA: ICD-10-CM

## 2023-07-20 DIAGNOSIS — F51.01 PRIMARY INSOMNIA: ICD-10-CM

## 2023-07-20 DIAGNOSIS — Z12.11 SCREEN FOR COLON CANCER: ICD-10-CM

## 2023-07-20 DIAGNOSIS — I10 ESSENTIAL HYPERTENSION: Primary | ICD-10-CM

## 2023-07-20 DIAGNOSIS — F41.9 ANXIETY: ICD-10-CM

## 2023-07-20 PROCEDURE — 99214 OFFICE O/P EST MOD 30 MIN: CPT | Performed by: NURSE PRACTITIONER

## 2023-07-20 RX ORDER — OXYBUTYNIN CHLORIDE 15 MG/1
15 TABLET, EXTENDED RELEASE ORAL DAILY
Qty: 90 TABLET | Refills: 1 | Status: SHIPPED | OUTPATIENT
Start: 2023-07-20

## 2023-08-04 ENCOUNTER — PREP FOR SURGERY (OUTPATIENT)
Dept: OTHER | Facility: HOSPITAL | Age: 63
End: 2023-08-04
Payer: COMMERCIAL

## 2023-08-04 DIAGNOSIS — Z12.11 SCREEN FOR COLON CANCER: Primary | ICD-10-CM

## 2023-08-04 RX ORDER — SODIUM CHLORIDE 9 MG/ML
40 INJECTION, SOLUTION INTRAVENOUS AS NEEDED
OUTPATIENT
Start: 2023-08-04

## 2023-08-04 RX ORDER — DEXTROSE AND SODIUM CHLORIDE 5; .45 G/100ML; G/100ML
100 INJECTION, SOLUTION INTRAVENOUS CONTINUOUS PRN
OUTPATIENT
Start: 2023-08-04

## 2023-08-14 DIAGNOSIS — F51.01 PRIMARY INSOMNIA: ICD-10-CM

## 2023-08-14 RX ORDER — TRAZODONE HYDROCHLORIDE 150 MG/1
150 TABLET ORAL NIGHTLY
Qty: 90 TABLET | Refills: 3 | Status: SHIPPED | OUTPATIENT
Start: 2023-08-14